# Patient Record
Sex: MALE | Race: BLACK OR AFRICAN AMERICAN | URBAN - METROPOLITAN AREA
[De-identification: names, ages, dates, MRNs, and addresses within clinical notes are randomized per-mention and may not be internally consistent; named-entity substitution may affect disease eponyms.]

---

## 2017-08-14 ENCOUNTER — OFFICE VISIT (OUTPATIENT)
Dept: FAMILY MEDICINE CLINIC | Age: 32
End: 2017-08-14

## 2017-08-14 VITALS
TEMPERATURE: 97.2 F | SYSTOLIC BLOOD PRESSURE: 111 MMHG | HEART RATE: 70 BPM | DIASTOLIC BLOOD PRESSURE: 76 MMHG | RESPIRATION RATE: 14 BRPM | BODY MASS INDEX: 32.47 KG/M2 | OXYGEN SATURATION: 96 % | WEIGHT: 202 LBS | HEIGHT: 66 IN

## 2017-08-14 DIAGNOSIS — R86.8 DECREASED SPERM MOTILITY: ICD-10-CM

## 2017-08-14 DIAGNOSIS — Z23 ENCOUNTER FOR IMMUNIZATION: ICD-10-CM

## 2017-08-14 DIAGNOSIS — Z00.00 ROUTINE MEDICAL EXAM: Primary | ICD-10-CM

## 2017-08-14 DIAGNOSIS — E78.2 MIXED HYPERLIPIDEMIA: ICD-10-CM

## 2017-08-14 LAB
BILIRUB UR QL STRIP: NEGATIVE
GLUCOSE UR-MCNC: NEGATIVE MG/DL
KETONES P FAST UR STRIP-MCNC: NEGATIVE MG/DL
PH UR STRIP: 6 [PH] (ref 4.6–8)
PROT UR QL STRIP: NEGATIVE MG/DL
SP GR UR STRIP: 1.02 (ref 1–1.03)
UA UROBILINOGEN AMB POC: NORMAL (ref 0.2–1)
URINALYSIS CLARITY POC: CLEAR
URINALYSIS COLOR POC: YELLOW
URINE BLOOD POC: NORMAL
URINE LEUKOCYTES POC: NEGATIVE
URINE NITRITES POC: NEGATIVE

## 2017-08-14 NOTE — PROGRESS NOTES
Subjective: Diogo Mills is a 29 y.o. male presenting for his annual checkup. Present for CPE, last Complete Physical exam was couple yrs ago ,  Up todate w/ all vaccination, last tetanus vaccine was in >5 yrs     . Last psa exam was never,     last colonoscopy was never, No past surgical hx,  last bone dexa scan was never, No family hx of breast cancer   no family hx of prostate cancer,    no family hx of colon cancer, father 63 yo from unknown,  mother 52yo  from [place], ++sexaully active and uses Safe sex, +physically active, no cigs, no Etoh  Currently on no meds,     Review of Systems    Constitutional: Negative for chills and fever, not obese okay body mass index for his age. HENT: Negative for ear head pain and nosebleeds. Eyes: Negative for blurred vision, pain and discharge. Respiratory: Negative for shortness of breath, wheezing cough sore throat. Cardiovascular: Negative for chest pain and leg swelling, racing heart . Gastrointestinal: Negative for constipation, diarrhea, nausea and vomiting. Genitourinary: Negative for frequency. Musculoskeletal: Negative for joint pain. Skin: Negative for itching, pimples or acne rash. Neurological: Negative for headaches. Psychiatric/Behavioral: Negative for depression has normal interest to do things and not depressed the patient is not nervous/anxious. Specific concerns today: trying to have kids since last  uable to do so so far, had the sperm count was told to be low on ##S      No Known Allergies  No past medical history on file.   Past Surgical History:   Procedure Laterality Date    HX HEENT       Family History   Problem Relation Age of Onset    Diabetes Mother     Kidney Disease Mother     Hypertension Mother     Diabetes Father     Kidney Disease Father     Stroke Father      Social History   Substance Use Topics    Smoking status: Never Smoker    Smokeless tobacco: Never Used    Alcohol use 1.8 oz/week 3 Cans of beer per week        Lab Results  Component Value Date/Time   WBC 4.9 08/12/2016 12:13 PM   HGB 15.7 08/12/2016 12:13 PM   HCT 45.2 08/12/2016 12:13 PM   PLATELET 702 11/23/9681 12:13 PM   MCV 80 08/12/2016 12:13 PM     Lab Results  Component Value Date/Time   GFR est non- 08/12/2016 12:13 PM   GFR est  08/12/2016 12:13 PM   Creatinine 0.98 08/12/2016 12:13 PM   BUN 11 08/12/2016 12:13 PM   Sodium 142 08/12/2016 12:13 PM   Potassium 4.6 08/12/2016 12:13 PM   Chloride 103 08/12/2016 12:13 PM   CO2 23 08/12/2016 12:13 PM            Objective:   Visit Vitals    /76 (BP 1 Location: Left arm, BP Patient Position: At rest)    Pulse 70    Temp 97.2 °F (36.2 °C) (Oral)    Resp 14    Ht 5' 6\" (1.676 m)    Wt 202 lb (91.6 kg)    SpO2 96%    BMI 32.6 kg/m2     The patient appears well, alert, oriented x 3, in no distress. ENT normal.  Neck supple. No adenopathy or thyromegaly. GEO. Lungs are clear, good air entry, no wheezes, rhonchi or rales. S1 and S2 normal, no murmurs, regular rate and rhythm. Abdomen is soft without tenderness, guarding, mass or organomegaly.  exam: no penile lesions or discharge, no testicular masses or tenderness, no hernias. Extremities show no edema, normal peripheral pulses. Neurological is normal without focal findings. Assessment/Plan:   healthy adult male        Diagnoses and all orders for this visit:    1. Routine medical exam  -     CBC W/O DIFF  -     AMB POC URINALYSIS DIP STICK AUTO W/O MICRO  -     TSH 3RD GENERATION  -     METABOLIC PANEL, COMPREHENSIVE  -     LIPID PANEL    2. Mixed hyperlipidemia  -     METABOLIC PANEL, COMPREHENSIVE  -     LIPID PANEL    3. Encounter for immunization  -     TETANUS, DIPHTHERIA TOXOIDS AND ACELLULAR PERTUSSIS VACCINE (TDAP), IN INDIVIDS. >=7, IM    4. Decreased sperm motility  -     TESTOSTERONE, FREE & TOTAL  -     FSH AND LH  -     PROLACTIN  .     At this time patient was told to lose weight, so that his body mass index would get into a normal level between 20-25,  increase physical activity, limit alcohol consumption, stop secondhand tobacco exposure    In addition the patient was told to start an active life style modifications, for which includes creating a an interesting delightful to do list,  such as start of a light physical activity with a brisk daily walking 30 minutes most days of the week, most likely to total of 150 minutes per week, then the patient was told to try to avoid fatty fast foods, have a low-fat low-cholesterol diet, include seafood such as adding fatty fish such as [place] Pu, Mackerel, [place] to the diet, increase vegetables and fruits, nuts 3-4 times per week and finally have a low-salt and K rich food intake for a good 4-6 months possibly for ever for the best outcome,   All mentioned recommendations, have to be done at least most days of the weeks for the best result,  Routine labs ordered, and the needed abnormal labs will be discussed soon and they can be repeated in 3-6 months. In addition relevant handouts were given to the patient for a better understanding,    patient was told to call if any problems.   Patient acknowledged understanding and     Patient agreed with today's recommendation

## 2017-08-14 NOTE — MR AVS SNAPSHOT
Visit Information Date & Time Provider Department Dept. Phone Encounter #  
 8/14/2017 11:15 AM Darolyn Severance, MD [facility] [phone] [id number] Follow-up Instructions Return in about 6 months (around 2/14/2018), or if symptoms worsen or fail to improve. Upcoming Health Maintenance Date Due DTaP/Tdap/Td series (1 - Tdap) 5/22/2008 INFLUENZA AGE 9 TO ADULT 8/1/2017 Allergies as of 8/14/2017  Review Complete On: 8/14/2017 By: Darolyn Severance, MD  
 No Known Allergies Current Immunizations  Never Reviewed Name Date Tdap  Incomplete Not reviewed this visit You Were Diagnosed With   
  
 Codes Comments Routine medical exam    -  Primary ICD-10-CM: Z00.00 ICD-9-CM: V70.0 Mixed hyperlipidemia     ICD-10-CM: E78.2 ICD-9-CM: 272.2 Encounter for immunization     ICD-10-CM: G37 ICD-9-CM: V03.89 Decreased sperm motility     ICD-10-CM: R86.8 ICD-9-CM: 792.2 Vitals BP Pulse Temp Resp Height(growth percentile) Weight(growth percentile) 111/76 (BP 1 Location: Left arm, BP Patient Position: At rest) 70 97.2 °F (36.2 °C) (Oral) 14 5' 6\" (1.676 m) 202 lb (91.6 kg) SpO2 BMI Smoking Status 96% 32.6 kg/m2 Never Smoker Vitals History BMI and BSA Data Body Mass Index Body Surface Area  
 32.6 kg/m 2 2.07 m 2 Preferred Pharmacy Pharmacy Name Phone [facility] [address] [address] [address] [phone] Your Updated Medication List  
  
Notice  As of 8/14/2017 12:21 PM  
 You have not been prescribed any medications. We Performed the Following AMB POC URINALYSIS DIP STICK AUTO W/O MICRO [92090 CPT(R)] CBC W/O DIFF [88624 CPT(R)] [facility] AND  [46293 CPT(R)] LIPID PANEL [59781 CPT(R)] METABOLIC PANEL, COMPREHENSIVE [34376 CPT(R)] PROLACTIN [13194 CPT(R)] TESTOSTERONE, FREE & TOTAL [57569 CPT(R)] TETANUS, DIPHTHERIA TOXOIDS AND ACELLULAR PERTUSSIS VACCINE (TDAP), IN INDIVIDS. >=7, IM [id number] CPT(R)] TSH 3RD GENERATION [94162 CPT(R)] Follow-up Instructions Return in about 6 months (around 2/14/2018), or if symptoms worsen or fail to improve. Patient Instructions High Cholesterol: Care Instructions Your Care Instructions Cholesterol is a type of fat in your blood. It is needed for many body functions, such as making new cells. Cholesterol is made by your body. It also comes from food you eat. High cholesterol means that you have too much of the fat in your blood. This raises your risk of a heart attack and stroke. LDL and HDL are part of your total cholesterol. LDL is the \"bad\" cholesterol. High LDL can raise your risk for heart disease, heart attack, and stroke. HDL is the \"good\" cholesterol. It helps clear bad cholesterol from the body. High HDL is linked with a lower risk of heart disease, heart attack, and stroke. Your cholesterol levels help your doctor find out your risk for having a heart attack or stroke. You and your doctor can talk about whether you need to lower your risk and what treatment is best for you. A heart-healthy lifestyle along with medicines can help lower your cholesterol and your risk. The way you choose to lower your risk will depend on how high your risk is for heart attack and stroke. It will also depend on how you feel about taking medicines. Follow-up care is a key part of your treatment and safety. Be sure to make and go to all appointments, and call your doctor if you are having problems. It's also a good idea to know your test results and keep a list of the medicines you take. How can you care for yourself at home? · Eat a variety of foods every day.  Good choices include fruits, vegetables, whole grains (like oatmeal), dried beans and peas, nuts and seeds, soy products (like tofu), and fat-free or low-fat dairy products. · Replace butter, margarine, and hydrogenated or partially hydrogenated oils with olive and canola oils. (Canola oil margarine without trans fat is fine.) · Replace red meat with fish, poultry, and soy protein (like tofu). · Limit processed and packaged foods like chips, crackers, and cookies. · Bake, broil, or steam foods. Don't grady them. · Be physically active. Get at least 30 minutes of exercise on most days of the week. Walking is a good choice. You also may want to do other activities, such as running, swimming, cycling, or playing tennis or team sports. · Stay at a healthy weight or lose weight by making the changes in eating and physical activity listed above. Losing just a small amount of weight, even 5 to 10 pounds, can reduce your risk for having a heart attack or stroke. · Do not smoke. When should you call for help? Watch closely for changes in your health, and be sure to contact your doctor if: 
· You need help making lifestyle changes. · You have questions about your medicine. Where can you learn more? Go to http://soniya-leona.info/. Enter C149 in the search box to learn more about \"High Cholesterol: Care Instructions. \" Current as of: April 3, 2017 Content Version: 11.3 © 4696-3912 [employer]. Care instructions adapted under license by [employer] (which disclaims liability or warranty for this information). If you have questions about a medical condition or this instruction, always ask your healthcare professional. [place] [address] any warranty or liability for your use of this information. Statins: Care Instructions Your Care Instructions Statins are medicines that lower your cholesterol and your risk for a heart attack and stroke. Cholesterol is a type of fat in your blood.  If you have too much cholesterol, it can build up in blood vessels. This raises your risk of heart disease, heart attack, and stroke. Statins lower cholesterol by blocking how much cholesterol your body makes. This prevents cholesterol from building up in your blood vessels. This is called hardening of the arteries. It is the starting point for some heart and blood flow problems, such as heart disease. Statins may also reduce inflammation around the buildup (called plaque). This can lower the risk that the plaque will break apart and lead to a heart attack or stroke. A heart-healthy lifestyle is important for lowering your risk whether you take statins or not. This includes eating healthy foods, being active, staying at a healthy weight, and not smoking. You must take statins regularly for them to work well. If you stop, your cholesterol and your risk will go back up. Examples of statins include: · Atorvastatin (Lipitor). · Lovastatin (Mevacor). · Pravastatin (Pravachol). · Simvastatin (Zocor). Statins interact with many medicines. So tell your doctor all of the other medicines that you take. These include prescription medicines, over-the-counter medicines, dietary supplements, and herbal products. Follow-up care is a key part of your treatment and safety. Be sure to make and go to all appointments, and call your doctor if you are having problems. It's also a good idea to know your test results and keep a list of the medicines you take. How can you care for yourself at home? · Take statins exactly as your doctor tells you. High cholesterol has no symptoms. So it is easy to forget to take the pills. Try to make a system that reminds you to take them. · Do not take two or more medicines at the same time unless the doctor told you to. Statins can interact with other medicines. · Always tell your doctor if you think you are having a side effect. If side effects are a problem with one medicine, a different one may be used. · Keep making the lifestyle changes your doctor suggests. Eat heart-healthy foods, be active, don't smoke, and stay at a healthy weight. · Talk to your doctor about avoiding grapefruit juice if you take statins. Grapefruit juice can raise the level of this medicine in your blood. This could increase side effects. When should you call for help? Watch closely for changes in your health, and be sure to contact your doctor if: 
· You have side effects of statins. These include: ¨ Fatigue. ¨ Upset stomach. ¨ Gas. ¨ Constipation. ¨ Pain or cramps in the belly. ¨ Muscle aches. · You have any new symptoms or side effects. Where can you learn more? Go to http://soniya-leona.info/. Enter R358 in the search box to learn more about \"Statins: Care Instructions. \" Current as of: April 3, 2017 Content Version: 11.3 © 5275-6988 [employer]. Care instructions adapted under license by [employer] (which disclaims liability or warranty for this information). If you have questions about a medical condition or this instruction, always ask your healthcare professional. [place] [address] any warranty or liability for your use of this information. Heart-Healthy Diet: Care Instructions Your Care Instructions A heart-healthy diet has lots of vegetables, fruits, nuts, beans, and whole grains, and is low in salt. It limits foods that are high in saturated fat, such as meats, cheeses, and fried foods. It may be hard to change your diet, but even small changes can lower your risk of heart attack and heart disease. Follow-up care is a key part of your treatment and safety. Be sure to make and go to all appointments, and call your doctor if you are having problems. It's also a good idea to know your test results and keep a list of the medicines you take. How can you care for yourself at home? Watch your portions · Learn what a serving is. A \"serving\" and a \"portion\" are not always the same thing. Make sure that you are not eating larger portions than are recommended. For example, a serving of pasta is ½ cup. A serving size of meat is 2 to 3 ounces. A 3-ounce serving is about the size of a deck of cards. Measure serving sizes until you are good at [place]" them. Keep in mind that restaurants often serve portions that are 2 or 3 times the size of one serving. · To keep your energy level up and keep you from feeling hungry, eat often but in smaller portions. · Eat only the number of calories you need to stay at a healthy weight. If you need to lose weight, eat fewer calories than your body burns (through exercise and other physical activity). Eat more fruits and vegetables · Eat a variety of fruit and vegetables every day. Dark green, deep orange, red, or yellow fruits and vegetables are especially good for you. Examples include spinach, carrots, peaches, and berries. · Keep carrots, celery, and other veggies handy for snacks. Buy fruit that is in season and store it where you can see it so that you will be tempted to eat it. · Cook dishes that have a lot of veggies in them, such as stir-fries and soups. Limit saturated and trans fat · Read food labels, and try to avoid saturated and trans fats. They increase your risk of heart disease. Trans fat is found in many processed foods such as cookies and crackers. · Use olive or canola oil when you cook. Try cholesterol-lowering spreads, such as Benecol or Take Control. · Bake, broil, grill, or steam foods instead of frying them. · Choose lean meats instead of high-fat meats such as hot dogs and sausages. Cut off all visible fat when you prepare meat. · Eat fish, skinless poultry, and meat alternatives such as soy products instead of high-fat meats. Soy products, such as tofu, may be especially good for your heart. · Choose low-fat or fat-free milk and dairy products. Eat fish · Eat at least two servings of fish a week. Certain fish, such as salmon and tuna, contain omega-3 fatty acids, which may help reduce your risk of heart attack. Eat foods high in fiber · Eat a variety of grain products every day. Include whole-grain foods that have lots of fiber and nutrients. Examples of whole-grain foods include oats, whole wheat bread, and brown rice. · Buy whole-grain breads and cereals, instead of white bread or pastries. Limit salt and sodium · Limit how much salt and sodium you eat to help lower your blood pressure. · Taste food before you salt it. Add only a little salt when you think you need it. With time, your taste buds will adjust to less salt. · Eat fewer snack items, fast foods, and other high-salt, processed foods. Check food labels for the amount of sodium in packaged foods. · Choose low-sodium versions of canned goods (such as soups, vegetables, and beans). Limit sugar · Limit drinks and foods with added sugar. These include candy, desserts, and soda pop. Limit alcohol · Limit alcohol to no more than 2 drinks a day for men and 1 drink a day for women. Too much alcohol can cause health problems. When should you call for help? Watch closely for changes in your health, and be sure to contact your doctor if: 
· You would like help planning heart-healthy meals. Where can you learn more? Go to http://soniya-leona.info/. Enter V137 in the search box to learn more about \"Heart-Healthy Diet: Care Instructions. \" Current as of: April 3, 2017 Content Version: 11.3 © 0732-4607 [employer] Care instructions adapted under license by [employer] (which disclaims liability or warranty for this information).  If you have questions about a medical condition or this instruction, always ask your healthcare professional. [employer] disclaims any warranty or liability for your use of this information. Introducing hospitals & HEALTH SERVICES! Humberto Bates introduces [employer] patient portal. Now you can access parts of your medical record, email your doctor's office, and request medication refills online. 1. In your internet browser, go to [email or web address] 2. Click on the First Time User? Click Here link in the Sign In box. You will see the New Member Sign Up page. 3. Enter your [employer] Access Code exactly as it appears below. You will not need to use this code after youve completed the sign-up process. If you do not sign up before the expiration date, you must request a new code. · [employer] Access Code: [email or web address] Expires: 11/12/2017 12:21 PM 
 
4. Enter the last four digits of your Social Security Number (xxxx) and Date of Birth (mm/dd/yyyy) as indicated and click Submit. You will be taken to the next sign-up page. 5. Create a [employer] ID. This will be your [employer] login ID and cannot be changed, so think of one that is secure and easy to remember. 6. Create a [employer] password. You can change your password at any time. 7. Enter your Password Reset Question and Answer. This can be used at a later time if you forget your password. 8. Enter your e-mail address. You will receive e-mail notification when new information is available in [address] 9. Click Sign Up. You can now view and download portions of your medical record. 10. Click the Download Summary menu link to download a portable copy of your medical information. If you have questions, please visit the Frequently Asked Questions section of the [employer] website. Remember, [employer] is NOT to be used for urgent needs. For medical emergencies, dial 911. Now available from your iPhone and Android! Please provide this summary of care documentation to your next provider. Your primary care clinician is listed as Darolyn Severance. If you have any questions after today's visit, please call [phone].

## 2017-08-14 NOTE — PROGRESS NOTES
Zeny Onofre        Name and  verified        Chief Complaint   Patient presents with   [address] [facility] Patient (Former patient of Dr. Gabby Zapata)

## 2017-08-14 NOTE — PATIENT INSTRUCTIONS
High Cholesterol: Care Instructions  Your Care Instructions  Cholesterol is a type of fat in your blood. It is needed for many body functions, such as making new cells. Cholesterol is made by your body. It also comes from food you eat. High cholesterol means that you have too much of the fat in your blood. This raises your risk of a heart attack and stroke. LDL and HDL are part of your total cholesterol. LDL is the \"bad\" cholesterol. High LDL can raise your risk for heart disease, heart attack, and stroke. HDL is the \"good\" cholesterol. It helps clear bad cholesterol from the body. High HDL is linked with a lower risk of heart disease, heart attack, and stroke. Your cholesterol levels help your doctor find out your risk for having a heart attack or stroke. You and your doctor can talk about whether you need to lower your risk and what treatment is best for you. A heart-healthy lifestyle along with medicines can help lower your cholesterol and your risk. The way you choose to lower your risk will depend on how high your risk is for heart attack and stroke. It will also depend on how you feel about taking medicines. Follow-up care is a key part of your treatment and safety. Be sure to make and go to all appointments, and call your doctor if you are having problems. It's also a good idea to know your test results and keep a list of the medicines you take. How can you care for yourself at home? · Eat a variety of foods every day. Good choices include fruits, vegetables, whole grains (like oatmeal), dried beans and peas, nuts and seeds, soy products (like tofu), and fat-free or low-fat dairy products. · Replace butter, margarine, and hydrogenated or partially hydrogenated oils with olive and canola oils. (Canola oil margarine without trans fat is fine.)  · Replace red meat with fish, poultry, and soy protein (like tofu). · Limit processed and packaged foods like chips, crackers, and cookies.   · Bake, broil, or steam foods. Don't grady them. · Be physically active. Get at least 30 minutes of exercise on most days of the week. Walking is a good choice. You also may want to do other activities, such as running, swimming, cycling, or playing tennis or team sports. · Stay at a healthy weight or lose weight by making the changes in eating and physical activity listed above. Losing just a small amount of weight, even 5 to 10 pounds, can reduce your risk for having a heart attack or stroke. · Do not smoke. When should you call for help? Watch closely for changes in your health, and be sure to contact your doctor if:  · You need help making lifestyle changes. · You have questions about your medicine. Where can you learn more? Go to http://soniya-leona.info/. Enter A075 in the search box to learn more about \"High Cholesterol: Care Instructions. \"  Current as of: April 3, 2017  Content Version: 11.3  © 5610-4785 Tarisa. Care instructions adapted under license by [employer] (which disclaims liability or warranty for this information). If you have questions about a medical condition or this instruction, always ask your healthcare professional. [address] any warranty or liability for your use of this information. Statins: Care Instructions  Your Care Instructions  Statins are medicines that lower your cholesterol and your risk for a heart attack and stroke. Cholesterol is a type of fat in your blood. If you have too much cholesterol, it can build up in blood vessels. This raises your risk of heart disease, heart attack, and stroke. Statins lower cholesterol by blocking how much cholesterol your body makes. This prevents cholesterol from building up in your blood vessels. This is called hardening of the arteries. It is the starting point for some heart and blood flow problems, such as heart disease.  Statins may also reduce inflammation around the buildup (called plaque). This can lower the risk that the plaque will break apart and lead to a heart attack or stroke. A heart-healthy lifestyle is important for lowering your risk whether you take statins or not. This includes eating healthy foods, being active, staying at a healthy weight, and not smoking. You must take statins regularly for them to work well. If you stop, your cholesterol and your risk will go back up. Examples of statins include:  · Atorvastatin (Lipitor). · Lovastatin (Mevacor). · Pravastatin (Pravachol). · Simvastatin (Zocor). Statins interact with many medicines. So tell your doctor all of the other medicines that you take. These include prescription medicines, over-the-counter medicines, dietary supplements, and herbal products. Follow-up care is a key part of your treatment and safety. Be sure to make and go to all appointments, and call your doctor if you are having problems. It's also a good idea to know your test results and keep a list of the medicines you take. How can you care for yourself at home? · Take statins exactly as your doctor tells you. High cholesterol has no symptoms. So it is easy to forget to take the pills. Try to make a system that reminds you to take them. · Do not take two or more medicines at the same time unless the doctor told you to. Statins can interact with other medicines. · Always tell your doctor if you think you are having a side effect. If side effects are a problem with one medicine, a different one may be used. · Keep making the lifestyle changes your doctor suggests. Eat heart-healthy foods, be active, don't smoke, and stay at a healthy weight. · Talk to your doctor about avoiding grapefruit juice if you take statins. Grapefruit juice can raise the level of this medicine in your blood. This could increase side effects. When should you call for help?   Watch closely for changes in your health, and be sure to contact your doctor if:  · You have side effects of statins. These include:  ¨ Fatigue. ¨ Upset stomach. ¨ Gas. ¨ Constipation. ¨ Pain or cramps in the belly. ¨ Muscle aches. · You have any new symptoms or side effects. Where can you learn more? Go to http://soniya-leona.info/. Enter R358 in the search box to learn more about \"Statins: Care Instructions. \"  Current as of: April 3, 2017  Content Version: 11.3  © 7993-2275 Kwicr. Care instructions adapted under license by [employer] (which disclaims liability or warranty for this information). If you have questions about a medical condition or this instruction, always ask your healthcare professional. [address] any warranty or liability for your use of this information. Heart-Healthy Diet: Care Instructions  Your Care Instructions    A heart-healthy diet has lots of vegetables, fruits, nuts, beans, and whole grains, and is low in salt. It limits foods that are high in saturated fat, such as meats, cheeses, and fried foods. It may be hard to change your diet, but even small changes can lower your risk of heart attack and heart disease. Follow-up care is a key part of your treatment and safety. Be sure to make and go to all appointments, and call your doctor if you are having problems. It's also a good idea to know your test results and keep a list of the medicines you take. How can you care for yourself at home? Watch your portions  · Learn what a serving is. A \"serving\" and a \"portion\" are not always the same thing. Make sure that you are not eating larger portions than are recommended. For example, a serving of pasta is ½ cup. A serving size of meat is 2 to 3 ounces. A 3-ounce serving is about the size of a deck of cards. Measure serving sizes until you are good at [place]" them. Keep in mind that restaurants often serve portions that are 2 or 3 times the size of one serving.   · To keep your energy level up and keep you from feeling hungry, eat often but in smaller portions. · Eat only the number of calories you need to stay at a healthy weight. If you need to lose weight, eat fewer calories than your body burns (through exercise and other physical activity). Eat more fruits and vegetables  · Eat a variety of fruit and vegetables every day. Dark green, deep orange, red, or yellow fruits and vegetables are especially good for you. Examples include spinach, carrots, peaches, and berries. · Keep carrots, celery, and other veggies handy for snacks. Buy fruit that is in season and store it where you can see it so that you will be tempted to eat it. · Cook dishes that have a lot of veggies in them, such as stir-fries and soups. Limit saturated and trans fat  · Read food labels, and try to avoid saturated and trans fats. They increase your risk of heart disease. Trans fat is found in many processed foods such as cookies and crackers. · Use olive or canola oil when you cook. Try cholesterol-lowering spreads, such as Benecol or Take Control. · Bake, broil, grill, or steam foods instead of frying them. · Choose lean meats instead of high-fat meats such as hot dogs and sausages. Cut off all visible fat when you prepare meat. · Eat fish, skinless poultry, and meat alternatives such as soy products instead of high-fat meats. Soy products, such as tofu, may be especially good for your heart. · Choose low-fat or fat-free milk and dairy products. Eat fish  · Eat at least two servings of fish a week. Certain fish, such as salmon and tuna, contain omega-3 fatty acids, which may help reduce your risk of heart attack. Eat foods high in fiber  · Eat a variety of grain products every day. Include whole-grain foods that have lots of fiber and nutrients. Examples of whole-grain foods include oats, whole wheat bread, and brown rice.   · Buy whole-grain breads and cereals, instead of white bread or pastries. Limit salt and sodium  · Limit how much salt and sodium you eat to help lower your blood pressure. · Taste food before you salt it. Add only a little salt when you think you need it. With time, your taste buds will adjust to less salt. · Eat fewer snack items, fast foods, and other high-salt, processed foods. Check food labels for the amount of sodium in packaged foods. · Choose low-sodium versions of canned goods (such as soups, vegetables, and beans). Limit sugar  · Limit drinks and foods with added sugar. These include candy, desserts, and soda pop. Limit alcohol  · Limit alcohol to no more than 2 drinks a day for men and 1 drink a day for women. Too much alcohol can cause health problems. When should you call for help? Watch closely for changes in your health, and be sure to contact your doctor if:  · You would like help planning heart-healthy meals. Where can you learn more? Go to http://soniya-leona.info/. Enter V137 in the search box to learn more about \"Heart-Healthy Diet: Care Instructions. \"  Current as of: April 3, 2017  Content Version: 11.3  © 5565-3703 [employer]. Care instructions adapted under license by [employer] (which disclaims liability or warranty for this information). If you have questions about a medical condition or this instruction, always ask your healthcare professional. [place] [address] any warranty or liability for your use of this information.

## 2017-08-15 LAB
ALBUMIN SERPL-MCNC: 4.6 G/DL (ref 3.5–5.5)
ALBUMIN/GLOB SERPL: 1.7 {RATIO} (ref 1.2–2.2)
ALP SERPL-CCNC: 73 IU/L (ref 39–117)
ALT SERPL-CCNC: 63 IU/L (ref 0–44)
AST SERPL-CCNC: 43 IU/L (ref 0–40)
BILIRUB SERPL-MCNC: 0.5 MG/DL (ref 0–1.2)
BUN SERPL-MCNC: 12 MG/DL (ref 6–20)
BUN/CREAT SERPL: 13 (ref 9–20)
CALCIUM SERPL-MCNC: 9.6 MG/DL (ref 8.7–10.2)
CHLORIDE SERPL-SCNC: 106 MMOL/L (ref 96–106)
CHOLEST SERPL-MCNC: 182 MG/DL (ref 100–199)
CO2 SERPL-SCNC: 23 MMOL/L (ref 18–29)
CREAT SERPL-MCNC: 0.96 MG/DL (ref 0.76–1.27)
ERYTHROCYTE [DISTWIDTH] IN BLOOD BY AUTOMATED COUNT: 13.3 % (ref 12.3–15.4)
FSH SERPL-ACNC: 15.7 MIU/ML (ref 1.5–12.4)
GLOBULIN SER CALC-MCNC: 2.7 G/DL (ref 1.5–4.5)
GLUCOSE SERPL-MCNC: 86 MG/DL (ref 65–99)
HCT VFR BLD AUTO: 45 % (ref 37.5–51)
HDLC SERPL-MCNC: 47 MG/DL
HGB BLD-MCNC: 15.7 G/DL (ref 12.6–17.7)
LDLC SERPL CALC-MCNC: 123 MG/DL (ref 0–99)
LH SERPL-ACNC: 7.3 MIU/ML (ref 1.7–8.6)
MCH RBC QN AUTO: 28.1 PG (ref 26.6–33)
MCHC RBC AUTO-ENTMCNC: 34.9 G/DL (ref 31.5–35.7)
MCV RBC AUTO: 81 FL (ref 79–97)
PLATELET # BLD AUTO: 233 X10E3/UL (ref 150–379)
POTASSIUM SERPL-SCNC: 4.6 MMOL/L (ref 3.5–5.2)
PROLACTIN SERPL-MCNC: 7.8 NG/ML (ref 4–15.2)
PROT SERPL-MCNC: 7.3 G/DL (ref 6–8.5)
RBC # BLD AUTO: 5.58 X10E6/UL (ref 4.14–5.8)
SODIUM SERPL-SCNC: 145 MMOL/L (ref 134–144)
TESTOST FREE SERPL-MCNC: 11.6 PG/ML (ref 8.7–25.1)
TESTOST SERPL-MCNC: 392 NG/DL (ref 264–916)
TRIGL SERPL-MCNC: 60 MG/DL (ref 0–149)
TSH SERPL DL<=0.005 MIU/L-ACNC: 0.6 UIU/ML (ref 0.45–4.5)
VLDLC SERPL CALC-MCNC: 12 MG/DL (ref 5–40)
WBC # BLD AUTO: 4.7 X10E3/UL (ref 3.4–10.8)

## 2017-09-01 ENCOUNTER — TELEPHONE (OUTPATIENT)
Dept: FAMILY MEDICINE CLINIC | Age: 32
End: 2017-09-01

## 2017-09-01 NOTE — TELEPHONE ENCOUNTER
----- Message from Jose Guadalupe Lucia sent at 9/1/2017  9:01 AM EDT -----  Regarding: Dr. Viviane Moncada Telephone  Patient would like a call back regarding his test results.  Contact is [phone]

## 2017-10-19 ENCOUNTER — OFFICE VISIT (OUTPATIENT)
Dept: FAMILY MEDICINE CLINIC | Age: 32
End: 2017-10-19

## 2017-10-19 VITALS
HEIGHT: 66 IN | BODY MASS INDEX: 31.43 KG/M2 | DIASTOLIC BLOOD PRESSURE: 79 MMHG | HEART RATE: 73 BPM | WEIGHT: 195.6 LBS | OXYGEN SATURATION: 98 % | TEMPERATURE: 97 F | RESPIRATION RATE: 14 BRPM | SYSTOLIC BLOOD PRESSURE: 108 MMHG

## 2017-10-19 DIAGNOSIS — R86.8 DECREASED SPERM MOTILITY: ICD-10-CM

## 2017-10-19 DIAGNOSIS — R79.89 ABNORMAL LIVER FUNCTION TEST: Primary | ICD-10-CM

## 2017-10-19 DIAGNOSIS — H91.93 HEARING PROBLEM OF BOTH EARS: ICD-10-CM

## 2017-10-19 DIAGNOSIS — H50.10 EXOTROPIA OF BOTH EYES: ICD-10-CM

## 2017-10-19 NOTE — PROGRESS NOTES
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, was also havign trouble with hearing since birth,         No Known Allergies  Past Medical History:   Diagnosis Date    Abnormal liver function test 10/19/2017    Decreased sperm motility 8/14/2017     Past Surgical History:   Procedure Laterality Date    HX HEENT       Family History   Problem Relation Age of Onset    Diabetes Mother     Kidney Disease Mother     Hypertension Mother     Diabetes Father     Kidney Disease Father     Stroke Father      Social History   Substance Use Topics    Smoking status: Never Smoker    Smokeless tobacco: Never Used    Alcohol use 1.8 oz/week     3 Cans of beer per week      Lab Results  Component Value Date/Time   Cholesterol, total 182 08/14/2017 12:22 PM   HDL Cholesterol 47 08/14/2017 12:22 PM   LDL, calculated 123 08/14/2017 12:22 PM   Triglyceride 60 08/14/2017 12:22 PM     Lab Results  Component Value Date/Time   ALT (SGPT) 63 08/14/2017 12:22 PM   AST (SGOT) 43 08/14/2017 12:22 PM   Alk.  phosphatase 73 08/14/2017 12:22 PM   Bilirubin, total 0.5 08/14/2017 12:22 PM   Albumin 4.6 08/14/2017 12:22 PM   Protein, total 7.3 08/14/2017 12:22 PM   PLATELET 080 97/93/9735 12:22 PM       Lab Results  Component Value Date/Time   GFR est non- 08/14/2017 12:22 PM   GFR est  08/14/2017 12:22 PM   Creatinine 0.96 08/14/2017 12:22 PM   BUN 12 08/14/2017 12:22 PM   Sodium 145 08/14/2017 12:22 PM   Potassium 4.6 08/14/2017 12:22 PM   Chloride 106 08/14/2017 12:22 PM   CO2 23 08/14/2017 12:22 PM   No results found for: Momo SANDERS, [id number], [id number], PSALTLab Results  Component Value Date/Time   TSH 0.599 08/14/2017 12:22 PM           Review of Systems   Constitutional: Negative for chills and fever. HENT: Negative for ear pain and nosebleeds. Eyes: Negative for blurred vision, pain and discharge. Respiratory: Negative for shortness of breath. Cardiovascular: Negative for chest pain and leg swelling. Gastrointestinal: Negative for constipation, diarrhea, nausea and vomiting. Genitourinary: Negative for frequency. Musculoskeletal: Negative for joint pain. Skin: Negative for itching and rash. Neurological: Negative for headaches. Psychiatric/Behavioral: Negative for depression. The patient is not nervous/anxious. Physical Exam   Constitutional: He is oriented to person, place, and time. He appears well-developed and well-nourished. HENT:   Head: Normocephalic and atraumatic. Mouth/Throat: No oropharyngeal exudate. Eyes: Conjunctivae and EOM are normal.   Neck: Normal range of motion. Neck supple. Cardiovascular: Normal rate, regular rhythm and normal heart sounds. No murmur heard. Pulmonary/Chest: Effort normal and breath sounds normal. No respiratory distress. Abdominal: Soft. Bowel sounds are normal. He exhibits no distension. There is no rebound. Musculoskeletal: He exhibits no edema or tenderness. Neurological: He is alert and oriented to person, place, and time. Skin: Skin is warm. No erythema. Psychiatric: He has a normal mood and affect. His behavior is normal.   Nursing note and vitals reviewed. ASSESSMENT and PLAN  Diagnoses and all orders for this visit:    1. Abnormal liver function test  -     CBC W/O DIFF  -     METABOLIC PANEL, COMPREHENSIVE  -     TSH 3RD GENERATION  -     LIPID PANEL  -     FSH AND LH  -     DHEA SULFATE  -     HIV 1/2 AG/AB, 4TH GENERATION,W RFLX CONFIRM  -     HEP B SURFACE AG  -     HEPATITIS C AB  -     PROSTATE SPECIFIC AG  -     TESTOSTERONE, FREE & TOTAL  -     ESTRADIOL  -     RPR  -     PROLACTIN    2.  Decreased sperm motility  -     CBC W/O DIFF  -     METABOLIC PANEL, COMPREHENSIVE  - TSH 3RD GENERATION  -     LIPID PANEL  -     FSH AND LH  -     DHEA SULFATE  -     HIV 1/2 AG/AB, 4TH GENERATION,W RFLX CONFIRM  -     HEP B SURFACE AG  -     HEPATITIS C AB  -     PROSTATE SPECIFIC AG  -     TESTOSTERONE, FREE & TOTAL  -     ESTRADIOL  -     RPR  -     PROLACTIN    3. Hearing problem of both ears  -     CBC W/O DIFF  -     METABOLIC PANEL, COMPREHENSIVE  -     TSH 3RD GENERATION  -     LIPID PANEL  -     FSH AND LH  -     DHEA SULFATE  -     HIV 1/2 AG/AB, 4TH GENERATION,W RFLX CONFIRM  -     HEP B SURFACE AG  -     HEPATITIS C AB  -     PROSTATE SPECIFIC AG  -     TESTOSTERONE, FREE & TOTAL  -     ESTRADIOL  -     RPR  -     PROLACTIN    4.  Exotropia of both eyes    f/u with ophtha not acute

## 2017-10-19 NOTE — MR AVS SNAPSHOT
Visit Information Date & Time Provider Department Dept. Phone Encounter #  
 10/19/2017  7:30 AM Chau Spencer MD [address] [place] [address] OFFICE-[facility] [phone] [id number] Upcoming Health Maintenance Date Due DTaP/Tdap/Td series (1 - Tdap) 5/22/2008 Allergies as of 10/19/2017  Review Complete On: 10/19/2017 By: Jas Alexandre LPN No Known Allergies Current Immunizations  Never Reviewed Name Date Tdap  Deferred (Patient Refused), 8/19/2012 Not reviewed this visit You Were Diagnosed With   
  
 Codes Comments Abnormal liver function test    -  Primary ICD-10-CM: R79.89 ICD-9-CM: 790.6 Decreased sperm motility     ICD-10-CM: R86.8 ICD-9-CM: 792.2 Hearing problem of both ears     ICD-10-CM: H91.93 
ICD-9-CM: V41.2 Exotropia of both eyes     ICD-10-CM: H50.10 ICD-9-CM: 378.10 Vitals BP Pulse Temp Resp Height(growth percentile) Weight(growth percentile) 108/79 (BP 1 Location: Left arm, BP Patient Position: At rest) 73 97 °F (36.1 °C) (Oral) 14 5' 6\" (1.676 m) 195 lb 9.6 oz (88.7 kg) SpO2 BMI Smoking Status 98% 31.57 kg/m2 Never Smoker Vitals History BMI and BSA Data Body Mass Index Body Surface Area  
 31.57 kg/m 2 2.03 m 2 Preferred Pharmacy Pharmacy Name Phone [facility] [address] [place] [address] [phone] Your Updated Medication List  
  
Notice  As of 10/19/2017  8:36 AM  
 You have not been prescribed any medications. We Performed the Following CBC W/O DIFF [37356 CPT(R)] DHEA SULFATE [96509 CPT(R)] ESTRADIOL [id number] CPT(R)] [facility] AND LH [78477 CPT(R)] HEP B SURFACE AG [id number] CPT(R)] HEPATITIS C AB [99011 CPT(R)] HIV 1/2 AG/AB, 4TH GENERATION,W RFLX CONFIRM [[id number] Custom] LIPID PANEL [02099 CPT(R)] METABOLIC PANEL, COMPREHENSIVE [93774 CPT(R)] PROLACTIN [39056 CPT(R)] PSA, DIAGNOSTIC (PROSTATE SPECIFIC AG) [id number] CPT(R)] RPR [36591 CPT(R)] TESTOSTERONE, FREE & TOTAL [27327 CPT(R)] TSH 3RD GENERATION [46180 CPT(R)] Introducing \[facility]\"" & HEALTH SERVICES! Dear Bryn Cobb: Thank you for requesting a [facility] account. Our records indicate that you already have an active [facility] account. You can access your account anytime at [email or web address] Did you know that you can access your hospital and ER discharge instructions at any time in [employer]? You can also review all of your test results from your hospital stay or ER visit. Additional Information If you have questions, please visit the Frequently Asked Questions section of the [employer] website at [email or web address] Remember, [facility] is NOT to be used for urgent needs. For medical emergencies, dial 911. Now available from your iPhone and Android! Please provide this summary of care documentation to your next provider. Your primary care clinician is listed as Genevieve Saba. If you have any questions after today's visit, please call [phone].

## 2017-10-19 NOTE — PROGRESS NOTES
Alka Brody      Name and  verified        Chief Complaint   Patient presents with    Abnormal Lab Results         Health Maintenance reviewed-discussed with patient. 1. Have you been to the ER, urgent care clinic since your last visit? Hospitalized since your last visit? No    2. Have you seen or consulted any other health care providers outside of the [address] [place] [address] since your last visit? Include any pap smears or colon screening.  No

## 2017-10-21 LAB
ALBUMIN SERPL-MCNC: 4.4 G/DL (ref 3.5–5.5)
ALBUMIN/GLOB SERPL: 1.7 {RATIO} (ref 1.2–2.2)
ALP SERPL-CCNC: 70 IU/L (ref 39–117)
ALT SERPL-CCNC: 29 IU/L (ref 0–44)
AST SERPL-CCNC: 20 IU/L (ref 0–40)
BILIRUB SERPL-MCNC: 0.3 MG/DL (ref 0–1.2)
BUN SERPL-MCNC: 12 MG/DL (ref 6–20)
BUN/CREAT SERPL: 12 (ref 9–20)
CALCIUM SERPL-MCNC: 9.5 MG/DL (ref 8.7–10.2)
CHLORIDE SERPL-SCNC: 106 MMOL/L (ref 96–106)
CHOLEST SERPL-MCNC: 141 MG/DL (ref 100–199)
CO2 SERPL-SCNC: 25 MMOL/L (ref 18–29)
CREAT SERPL-MCNC: 1.01 MG/DL (ref 0.76–1.27)
DHEA-S SERPL-MCNC: 281.1 UG/DL (ref 138.5–475.2)
ERYTHROCYTE [DISTWIDTH] IN BLOOD BY AUTOMATED COUNT: 13.1 % (ref 12.3–15.4)
ESTRADIOL SERPL-MCNC: 9.8 PG/ML (ref 7.6–42.6)
FSH SERPL-ACNC: 14.7 MIU/ML (ref 1.5–12.4)
GFR SERPLBLD CREATININE-BSD FMLA CKD-EPI: 115 ML/MIN/1.73
GFR SERPLBLD CREATININE-BSD FMLA CKD-EPI: 99 ML/MIN/1.73
GLOBULIN SER CALC-MCNC: 2.6 G/DL (ref 1.5–4.5)
GLUCOSE SERPL-MCNC: 90 MG/DL (ref 65–99)
HBV SURFACE AG SERPL QL IA: NEGATIVE
HCT VFR BLD AUTO: 42.9 % (ref 37.5–51)
HCV AB S/CO SERPL IA: <0.1 S/CO RATIO (ref 0–0.9)
HDLC SERPL-MCNC: 40 MG/DL
HGB BLD-MCNC: 15.1 G/DL (ref 12.6–17.7)
HIV 1+2 AB+HIV1 P24 AG SERPL QL IA: NON REACTIVE
LDLC SERPL CALC-MCNC: 90 MG/DL (ref 0–99)
LH SERPL-ACNC: 9.1 MIU/ML (ref 1.7–8.6)
MCH RBC QN AUTO: 28.2 PG (ref 26.6–33)
MCHC RBC AUTO-ENTMCNC: 35.2 G/DL (ref 31.5–35.7)
MCV RBC AUTO: 80 FL (ref 79–97)
PLATELET # BLD AUTO: 216 X10E3/UL (ref 150–379)
POTASSIUM SERPL-SCNC: 4.7 MMOL/L (ref 3.5–5.2)
PROLACTIN SERPL-MCNC: 6.8 NG/ML (ref 4–15.2)
PROT SERPL-MCNC: 7 G/DL (ref 6–8.5)
PSA SERPL-MCNC: 0.7 NG/ML (ref 0–4)
RBC # BLD AUTO: 5.36 X10E6/UL (ref 4.14–5.8)
RPR SER QL: NON REACTIVE
SODIUM SERPL-SCNC: 145 MMOL/L (ref 134–144)
TESTOST FREE SERPL-MCNC: 10.1 PG/ML (ref 8.7–25.1)
TESTOST SERPL-MCNC: 390 NG/DL (ref 264–916)
TRIGL SERPL-MCNC: 55 MG/DL (ref 0–149)
TSH SERPL DL<=0.005 MIU/L-ACNC: 0.72 UIU/ML (ref 0.45–4.5)
VLDLC SERPL CALC-MCNC: 11 MG/DL (ref 5–40)
WBC # BLD AUTO: 3.9 X10E3/UL (ref 3.4–10.8)

## 2017-12-14 DIAGNOSIS — R86.8 DECREASED SPERM MOTILITY: Primary | ICD-10-CM

## 2018-03-09 ENCOUNTER — OFFICE VISIT (OUTPATIENT)
Dept: ENDOCRINOLOGY | Age: 33
End: 2018-03-09

## 2018-03-09 VITALS
BODY MASS INDEX: 30.87 KG/M2 | HEIGHT: 66 IN | HEART RATE: 74 BPM | DIASTOLIC BLOOD PRESSURE: 83 MMHG | WEIGHT: 192.1 LBS | SYSTOLIC BLOOD PRESSURE: 119 MMHG

## 2018-03-09 DIAGNOSIS — R79.89 ABNORMAL PITUITARY LUTEINIZING HORMONE (LH): Primary | ICD-10-CM

## 2018-03-09 RX ORDER — BISMUTH SUBSALICYLATE 262 MG
1 TABLET,CHEWABLE ORAL DAILY
COMMUNITY

## 2018-03-09 NOTE — MR AVS SNAPSHOT
[address] [place] [address] [address] [address] [phone] Patient: Kamari Lee MRN: [id number] QDF:[id number] Visit Information Date & Time Provider Department Dept. Phone Encounter #  
 3/9/2018  2:50 PM Dee Dee Negron, [address] [place] [address] Diabetes and Endocrinology [phone] Follow-up Instructions Return in about 2 months (around 5/9/2018). Upcoming Health Maintenance Date Due DTaP/Tdap/Td series (2 - Td) 8/19/2022 Allergies as of 3/9/2018  Review Complete On: 3/9/2018 By: Dee Dee Negron MD  
 No Known Allergies Current Immunizations  Never Reviewed Name Date Tdap  Deferred (Patient Refused), 8/19/2012 Not reviewed this visit You Were Diagnosed With   
  
 Codes Comments Abnormal pituitary luteinizing hormone (LH)    -  Primary ICD-10-CM: R79.9 ICD-9-CM: 790.99 Vitals BP Pulse Height(growth percentile) Weight(growth percentile) BMI Smoking Status 119/83 (BP 1 Location: Left arm, BP Patient Position: Sitting) 74 5' 6\" (1.676 m) 192 lb 1.6 oz (87.1 kg) 31.01 kg/m2 Never Smoker Vitals History BMI and BSA Data Body Mass Index Body Surface Area 31.01 kg/m 2 2.01 m 2 Preferred Pharmacy Pharmacy Name Phone [facility] [address] [address] [address] [facility] [phone] Your Updated Medication List  
  
   
This list is accurate as of 3/9/18  3:52 PM.  Always use your most recent med list.  
  
  
  
  
 BIOTIN PO Take 300 mg by mouth. FRANNY EXTRACT PO Take  by mouth.  
  
 multivitamin tablet Commonly known as:  ONE A DAY Take 1 Tab by mouth daily. We Performed the Following CHROMOSOME ANALYSIS, BLOOD (CONSTITUTIONAL) [[id number] Custom] Comments:  
 KARYOTYPE ANALYSIS  
 [address] AND  [id number] CPT(R)] PROLACTIN [20296 CPT(R)] TESTOSTERONE, FREE & TOTAL [51957 CPT(R)] Follow-up Instructions Return in about 2 months (around 5/9/2018). Introducing [facility] & HEALTH SERVICES! Dear Ammon Jarvis: Thank you for requesting a [employer] account. Our records indicate that you already have an active [employer] account. You can access your account anytime at [email or web address] Did you know that you can access your hospital and ER discharge instructions at any time in [employer]? You can also review all of your test results from your hospital stay or ER visit. Additional Information If you have questions, please visit the Frequently Asked Questions section of the [employer] website at [email or web address] Remember, [employer] is NOT to be used for urgent needs. For medical emergencies, dial 911. Now available from your iPhone and Android! Please provide this summary of care documentation to your next provider. Your primary care clinician is listed as Micheline Quintanilla. If you have any questions after today's visit, please call [phone].

## 2018-03-09 NOTE — PROGRESS NOTES
CONSULTATION REQUESTED BY: Pattie Calderon MD     REASON FOR CONSULT: elevated gonadotropins    CHIEF COMPLAINT: abnormal blood test    HISTORY OF PRESENT ILLNESS:   Kvng Coulter is a 29 y.o. male with a PMHx as noted below who was referred to our endocrinology clinic for evaluation of elevated gonadotropins. Patient notes that he discussed concerns regarding infertility. He has been trying with his partner for a couple of years unsuccessfully. His partner was evaluated for infertility and everything was found to be normal, reportedly. His testosterone panel was evaluated and found to be normal, though his LH and FSH levels were elevated with normal prolactin and estradiol levels. Patient denies any issues with erectile dysfunction. Also admits that the desire is present. Denies any changes in his hair growth or shaving habits. Denies any changes in testicular size or any notable firmness, though notes one hangs lower than the other though both are descended. He is taking an extract called FRANNY extract which he started in December 2017. Never took testosterone before. No recent infections, hx of clamydia and gonnorhea in college but was treated and had not recurred. This however was started after the noted abnormal LH/FSH levels. He is also taking biotin and a multivitamin. Energy levels are reported to be stable. Denies headaches or vision problems, wears glasses at baseline.      Component      Latest Ref Rng & Units 10/19/2017 10/19/2017 10/19/2017 10/19/2017           8:43 AM  8:43 AM  8:43 AM  8:43 AM   Luteinizing hormone      1.7 - 8.6 mIU/mL    9.1 (H)   FSH      1.5 - 12.4 mIU/mL    14.7 (H)   Testosterone      264 - 916 ng/dL   390    Free testosterone (Direct)      8.7 - 25.1 pg/mL   10.1    TSH      0.450 - 4.500 uIU/mL       Estradiol      7.6 - 42.6 pg/mL  9.8     Prolactin      4.0 - 15.2 ng/mL 6.8        Component      Latest Ref Rng & Units 10/19/2017           8:43 AM   Luteinizing hormone 1.7 - 8.6 mIU/mL    FSH      1.5 - 12.4 mIU/mL    Testosterone      264 - 916 ng/dL    Free testosterone (Direct)      8.7 - 25.1 pg/mL    TSH      0.450 - 4.500 uIU/mL 0.722   Estradiol      7.6 - 42.6 pg/mL    Prolactin      4.0 - 15.2 ng/mL        PAST MEDICAL/SURGICAL HISTORY:   Past Medical History:   Diagnosis Date    Abnormal liver function test 10/19/2017    Decreased sperm motility 8/14/2017    Exotropia of both eyes 10/19/2017    Hearing problem of both ears 10/19/2017     Past Surgical History:   Procedure Laterality Date    HX HEENT         ALLERGIES:   No Known Allergies    MEDICATIONS ON ADMISSION:     Current Outpatient Prescriptions:     multivitamin (ONE A DAY) tablet, Take 1 Tab by mouth daily. , Disp: , Rfl:     FRANNY EXTRACT PO, Take  by mouth., Disp: , Rfl:     BIOTIN PO, Take 300 mg by mouth., Disp: , Rfl:     SOCIAL HISTORY:   Social History     Social History    Marital status: UNKNOWN     Spouse name: N/A    Number of children: N/A    Years of education: N/A     Occupational History    Not on file. Social History Main Topics    Smoking status: Never Smoker    Smokeless tobacco: Never Used    Alcohol use 1.8 oz/week     3 Cans of beer per week    Drug use: 5.00 per week     Special: Marijuana    Sexual activity: Not on file     Other Topics Concern    Not on file     Social History Narrative       FAMILY HISTORY:  Family History   Problem Relation Age of Onset    Diabetes Mother     Kidney Disease Mother     Hypertension Mother     Diabetes Father     Kidney Disease Father     Stroke Father        REVIEW OF SYSTEMS: Complete ROS assessed and noted for that which is described above, all else are negative.   Eyes: normal  ENT: normal  CVS: normal  Resp: normal  GI: normal  : normal  GYN: normal  Endocrine: normal  Integument: normal  Musculoskeletal: normal  Neuro: normal  Psych: normal      PHYSICAL EXAMINATION:    VITAL SIGNS:  Visit Vitals    /83 (BP 1 Location: Left arm, BP Patient Position: Sitting)    Pulse 74    Ht 5' 6\" (1.676 m)    Wt 192 lb 1.6 oz (87.1 kg)    BMI 31.01 kg/m2       GENERAL: NCAT, Sitting comfortably, NAD  EYES: EOMI, non-icteric, no proptosis  Ear/Nose/Throat: NCAT, no inflammation, no masses  LYMPH NODES: No LAD  CARDIOVASCULAR: S1 S2, RRR, No murmur, 2+ radial pulses  RESPIRATORY: CTA b/l, no wheeze/rales  GASTROINTESTINAL: soft, NT, ND,  MUSCULOSKELETAL: Normal ROM, no atrophy  SKIN: warm, no edema/rash/ or other skin changes  NEUROLOGIC: 5/5 power all extremities, no tremors, AAOx3  PSYCHIATRIC: Normal affect, Normal insight and judgement      REVIEW OF LABORATORY AND RADIOLOGY DATA:   Labs and documentation have been reviewed as described above. ASSESSMENT AND PLAN:   Alesha Sapp is a 29 y.o. male with a PMHx as noted above who was referred to our endocrinology clinic for evaluation of elevated gonadotropins. Elevated LH and FSH level    From reviewing the labs, it appears that LH/FSH levels are getting higher with time, at least per the interval period during when it was checked last year. His testosterone levels are normal though this may be due to an early stage of a chronic process, though not 100% clear. He is asymptomatic and this does fit that picture as well. As his TSH and prolactin level appear to be normal, and no symptoms of pituitary lesion present, a primary pituitary lesion is less likely, and a primary testicular lesion is more likely, and we discussed the possibility of Klinefelters disease as a possibility which should be evaluated. We will update his labs since it has not been checked since Oct 2017, to know his current status. We discussed the notion of evaluation with a fertility specialist for semen analysis and preservation / IVF if becomes necessary. Cautioned against supplements in his particular case.     Plan:   Repeat Total and free testosterone, LH/FSH, Prolactin around 8 AM fasting,  Will check a karyotype analysis  Will discuss results by phone, advised that karyotyping results may take some time,  Plan for a 2 month f/u in clinic,    Everardo Macias.  [address] [facility] End[facility]

## 2018-06-29 ENCOUNTER — OFFICE VISIT (OUTPATIENT)
Dept: ENDOCRINOLOGY | Age: 33
End: 2018-06-29

## 2018-06-29 VITALS
SYSTOLIC BLOOD PRESSURE: 113 MMHG | BODY MASS INDEX: 32.59 KG/M2 | WEIGHT: 202.8 LBS | HEIGHT: 66 IN | HEART RATE: 78 BPM | DIASTOLIC BLOOD PRESSURE: 78 MMHG

## 2018-06-29 DIAGNOSIS — R79.89 ABNORMAL PITUITARY LUTEINIZING HORMONE (LH): Primary | ICD-10-CM

## 2018-06-29 NOTE — PROGRESS NOTES
CHIEF COMPLAINT: elevated gonadotropins    HISTORY OF PRESENT ILLNESS:   Adelina Montaño is a 34 y.o. male with a PMHx as noted below who presents for f/u of elevated gonadotropins. Initial History:  Patient notes that he discussed concerns regarding infertility. He has been trying with his partner for a couple of years unsuccessfully. His partner was evaluated for infertility and everything was found to be normal, reportedly. His testosterone panel was evaluated and found to be normal, though his LH and FSH levels were elevated with normal prolactin and estradiol levels. Patient denies any issues with erectile dysfunction. Also admits that the desire is present. Denies any changes in his hair growth or shaving habits. Denies any changes in testicular size or any notable firmness, though notes one hangs lower than the other though both are descended. He is taking an extract called FRANNY extract which he started in December 2017. Never took testosterone before. No recent infections, hx of clamydia and gonnorhea in college but was treated and had not recurred. This however was started after the noted abnormal LH/FSH levels. He is also taking biotin and a multivitamin. Energy levels are reported to be stable. Denies headaches or vision problems, wears glasses at baseline. Interval History:  Patient just completed his labs just 4 days ago, most resulted but karyotyping is pending as expected. His testosterone (total and free) are normal, just mildly reduced from prior level though not significantly. Prolactin remains normal. LH stable but his [address] elevated. Total T: 372,   Free T: 9.4  LH 7.2  FSH 16.8  Prolactin 9.4  Patient again denies sexual dysfunction,  Regarding supplements, still taking the extracts, though notes he started that after he found out about his levels. He notes today that his girlfriend is now pregnant as of a week ago.        PAST MEDICAL/SURGICAL HISTORY:   Past Medical History: Diagnosis Date    Abnormal liver function test 10/19/2017    Decreased sperm motility 8/14/2017    Exotropia of both eyes 10/19/2017    Hearing problem of both ears 10/19/2017     Past Surgical History:   Procedure Laterality Date    HX HEENT         ALLERGIES:   No Known Allergies    MEDICATIONS ON ADMISSION:     Current Outpatient Prescriptions:     ashwagandha root extract,bulk, 2.5 % powd, by Does Not Apply route., Disp: , Rfl:     FENUGREEK SEED EXTRACT PO, Take  by mouth., Disp: , Rfl:     multivitamin (ONE A DAY) tablet, Take 1 Tab by mouth daily. , Disp: , Rfl:     BIOTIN PO, Take 300 mg by mouth., Disp: , Rfl:     FRANNY EXTRACT PO, Take  by mouth., Disp: , Rfl:     SOCIAL HISTORY:   Social History     Social History    Marital status: UNKNOWN     Spouse name: N/A    Number of children: N/A    Years of education: N/A     Occupational History    Not on file. Social History Main Topics    Smoking status: Never Smoker    Smokeless tobacco: Never Used    Alcohol use 1.8 oz/week     3 Cans of beer per week    Drug use: 5.00 per week     Special: Marijuana    Sexual activity: Not on file     Other Topics Concern    Not on file     Social History Narrative       FAMILY HISTORY:  Family History   Problem Relation Age of Onset    Diabetes Mother     Kidney Disease Mother     Hypertension Mother     Diabetes Father     Kidney Disease Father     Stroke Father        REVIEW OF SYSTEMS: Complete ROS assessed and noted for that which is described above, all else are negative.   Eyes: normal  ENT: normal  CVS: normal  Resp: normal  GI: normal  : normal  GYN: normal  Endocrine: normal  Integument: normal  Musculoskeletal: normal  Neuro: normal  Psych: normal      PHYSICAL EXAMINATION:    VITAL SIGNS:  Visit Vitals    /78 (BP 1 Location: Left arm, BP Patient Position: Sitting)    Pulse 78    Ht 5' 6\" (1.676 m)    Wt 202 lb 12.8 oz (92 kg)    BMI 32.73 kg/m2       GENERAL: NCAT, Sitting comfortably, NAD  EYES: EOMI, non-icteric, no proptosis  Ear/Nose/Throat: NCAT, no inflammation, no masses  LYMPH NODES: No LAD  CARDIOVASCULAR: S1 S2, RRR, No murmur, 2+ radial pulses  RESPIRATORY: CTA b/l, no wheeze/rales  GASTROINTESTINAL: soft, NT, ND,  MUSCULOSKELETAL: Normal ROM, no atrophy  SKIN: warm, no edema/rash/ or other skin changes  NEUROLOGIC: 5/5 power all extremities, no tremors, AAOx3  PSYCHIATRIC: Normal affect, Normal insight and judgement      REVIEW OF LABORATORY AND RADIOLOGY DATA:   Labs and documentation have been reviewed as described above. ASSESSMENT AND PLAN:   Burke Mims is a 34 y.o. male with a PMHx as noted above who presents for f/u of elevated gonadotropins. Elevated LH and FSH level    It is reassuring that his partner is able to get pregnant which suggests that he does still produce adequate viable sperm for reproduction. His testosterone levels though in the normal range, still lean toward the low normal side, and his gonadotropins keep fluctuating which I find to be suspicious for primary gonadal issue. We are still awaiting the result of his karyotype analysis of which I will advise him when received. Plan:   Awaiting karyotype analysis,   Counseled him to make sure his spouse considers prenatal vitamins and proper care, he notes that they will in fact have an appointment and ultrasound very soon. 3 month follow up with Testosterone panel    El AWAN  [address] [facility] End[facility]

## 2018-06-29 NOTE — PATIENT INSTRUCTIONS
I will call you with results,     Plan to follow up in 3 months,     Labs 3-4 days prior to your visit,     Vijay Kumar.  [address] [facility] Endocrinology  [address] [place] [address]

## 2018-06-29 NOTE — MR AVS SNAPSHOT
[address] [facility] [address] [address] [phone] Patient: Mannie Loja MRN: [id number] Visit Information Date & Time Provider Department Dept. Phone Encounter #  
 6/29/2018  2:30 PM Reny Lee, [address] [place] [address] Diabetes and Endocrinology  Follow-up Instructions Return in about 3 months (around 9/29/2018). Upcoming Health Maintenance Date Due Influenza Age 5 to Adult 8/1/2018 DTaP/Tdap/Td series (2 - Td) 8/19/2022 Allergies as of 6/29/2018  Review Complete On: 6/29/2018 By: Reny Lee MD  
 No Known Allergies Current Immunizations  Never Reviewed Name Date Tdap  Deferred (Patient Refused), 8/19/2012 Not reviewed this visit You Were Diagnosed With   
  
 Codes Comments Abnormal pituitary luteinizing hormone (LH)    -  Primary ICD-10-CM: R79.9 ICD-9-CM: 790.99 Vitals BP Pulse Height(growth percentile) Weight(growth percentile) BMI Smoking Status 113/78 (BP 1 Location: Left arm, BP Patient Position: Sitting) 78 5' 6\" (1.676 m) 202 lb 12.8 oz (92 kg) 32.73 kg/m2 Never Smoker BMI and BSA Data Body Mass Index Body Surface Area 32.73 kg/m 2 2.07 m 2 Preferred Pharmacy Pharmacy Name Phone [facility] [address] [place] [address] [phone] Your Updated Medication List  
  
   
This list is accurate as of 6/29/18  3:02 PM.  Always use your most recent med list.  
  
  
  
  
 ashwagandha root extract(bulk) 2.5 % Powd  
by Does Not Apply route. BIOTIN PO Take 300 mg by mouth. FENUGREEK SEED EXTRACT PO Take  by mouth. FRANNY EXTRACT PO Take  by mouth.  
  
 multivitamin tablet Commonly known as:  ONE A DAY Take 1 Tab by mouth daily. We Performed the Following [facility] AND  [44487 CPT(R)] TESTOSTERONE, FREE & TOTAL [17159 CPT(R)] Follow-up Instructions Return in about 3 months (around 9/29/2018). Patient Instructions I will call you with results,  
 
Plan to follow up in 3 months,  
 
Labs 3-4 days prior to your visit, Desmond Sommers. [address] [facility] & Endocrinology [address] [facility] SERVICES! Dear Rosibel Young: Thank you for requesting a [employer] account. Our records indicate that you already have an active [employer] account. You can access your account anytime at [email or web address] Did you know that you can access your hospital and ER discharge instructions at any time in [employer]? You can also review all of your test results from your hospital stay or ER visit. Additional Information If you have questions, please visit the Frequently Asked Questions section of the [employer] website at [email or web address] Remember, [employer] is NOT to be used for urgent needs. For medical emergencies, dial 911. Now available from your iPhone and Android! Please provide this summary of care documentation to your next provider. Your primary care clinician is listed as Lukasz Camacho. If you have any questions after today's visit, please call [phone].

## 2018-07-10 LAB
CELLS ANALYZED: 20
CELLS COUNTED: 20
CELLS KARYOTYPED.TOTAL BLD/T: 2
CLINICAL CYTOGENETICIST SPEC: NORMAL
DIAGNOSTIC IMP SPEC-IMP: NORMAL
FSH SERPL-ACNC: 16.8 MIU/ML (ref 1.5–12.4)
ISCN BAND LEVEL QL: 500
KARYOTYP BLD/T: NORMAL
LH SERPL-ACNC: 7.2 MIU/ML (ref 1.7–8.6)
PDF, 451356: NORMAL
PROLACTIN SERPL-MCNC: 9.4 NG/ML (ref 4–15.2)
SPECIMEN SOURCE: NORMAL
TESTOST FREE SERPL-MCNC: 9.4 PG/ML (ref 8.7–25.1)
TESTOST SERPL-MCNC: 372 NG/DL (ref 264–916)

## 2018-07-11 ENCOUNTER — TELEPHONE (OUTPATIENT)
Dept: ENDOCRINOLOGY | Age: 33
End: 2018-07-11

## 2018-07-11 NOTE — TELEPHONE ENCOUNTER
I called  Perri Bustamante and relayed the message from Dr. Angelo Flood.  He understood the information  Jack Conn

## 2018-07-11 NOTE — TELEPHONE ENCOUNTER
----- Message from Evans Landa MD sent at 7/11/2018 10:12 AM EDT -----  Mr. Sai Gaona genetic test is normal.  He does not have Klinefelters,  We will need to keep an eye on his testosterone levels and if not improved with next visit, we may consider getting a testicular ultrasound. Thanks,     Keyon Rae.  [address] [facility] Endocrinology  [address] [place] [address]

## 2018-07-11 NOTE — PROGRESS NOTES
Mr. Suzan Mcmahan genetic test is normal.  He does not have Klinefelters,  We will need to keep an eye on his testosterone levels and if not improved with next visit, we may consider getting a testicular ultrasound. Thanks,     Jose G Westbrook.  [facility] Endocrinology  [address] [place] [address]

## 2018-09-28 ENCOUNTER — OFFICE VISIT (OUTPATIENT)
Dept: ENDOCRINOLOGY | Age: 33
End: 2018-09-28

## 2018-09-28 VITALS
WEIGHT: 206.1 LBS | HEIGHT: 66 IN | BODY MASS INDEX: 33.12 KG/M2 | DIASTOLIC BLOOD PRESSURE: 75 MMHG | SYSTOLIC BLOOD PRESSURE: 114 MMHG | HEART RATE: 76 BPM

## 2018-09-28 DIAGNOSIS — R79.89 ABNORMAL PITUITARY LUTEINIZING HORMONE (LH): Primary | ICD-10-CM

## 2018-09-28 NOTE — PROGRESS NOTES
CHIEF COMPLAINT: elevated gonadotropins    HISTORY OF PRESENT ILLNESS:   Janusz Hendrix is a 34 y.o. male with a PMHx as noted below who presents for f/u of elevated gonadotropins. Initial History:  Patient notes that he discussed concerns regarding infertility. He has been trying with his partner for a couple of years unsuccessfully. His partner was evaluated for infertility and everything was found to be normal, reportedly. His testosterone panel was evaluated and found to be normal, though his LH and FSH levels were elevated with normal prolactin and estradiol levels. Patient denies any issues with erectile dysfunction. Also admits that the desire is present. Denies any changes in his hair growth or shaving habits. Denies any changes in testicular size or any notable firmness, though notes one hangs lower than the other though both are descended. He is taking an extract called FRANNY extract which he started in December 2017. Never took testosterone before. No recent infections, hx of clamydia and gonnorhea in college but was treated and had not recurred. This however was started after the noted abnormal LH/FSH levels. He is also taking biotin and a multivitamin. Energy levels are reported to be stable. Denies headaches or vision problems, wears glasses at baseline. Interval History:  Patient noted previously that he had been working on fertility with his girlfriend.    Noted on prior visit that she did get pregnant, but had a miscarriage around 9 weeks,  Patient also notably has not had any symptoms of sexual dysfunction,  A karyotype obtained revealed normal findings, none suggestive of klinefelter's,  Labs on prior visit as follows below, note that prelabs for this visit not completed,  Component      Latest Ref Rng & Units 6/25/2018 6/25/2018 6/25/2018           9:30 AM  9:30 AM  9:30 AM   Testosterone      264 - 916 ng/dL   372   Free testosterone (Direct)      8.7 - 25.1 pg/mL   9.4   Luteinizing hormone      1.7 - 8.6 mIU/mL  7.2    FSH      1.5 - 12.4 mIU/mL  16.8 (H)    Prolactin      4.0 - 15.2 ng/mL 9.4         PAST MEDICAL/SURGICAL HISTORY:   Past Medical History:   Diagnosis Date    Abnormal liver function test 10/19/2017    Decreased sperm motility 8/14/2017    Exotropia of both eyes 10/19/2017    Hearing problem of both ears 10/19/2017     Past Surgical History:   Procedure Laterality Date    HX HEENT         ALLERGIES:   No Known Allergies    MEDICATIONS ON ADMISSION:     Current Outpatient Prescriptions:     ashwagandha root extract,bulk, 2.5 % powd, by Does Not Apply route., Disp: , Rfl:     FENUGREEK SEED EXTRACT PO, Take  by mouth., Disp: , Rfl:     multivitamin (ONE A DAY) tablet, Take 1 Tab by mouth daily. , Disp: , Rfl:     BIOTIN PO, Take 300 mg by mouth., Disp: , Rfl:     FRANNY EXTRACT PO, Take  by mouth., Disp: , Rfl:     SOCIAL HISTORY:   Social History     Social History    Marital status: UNKNOWN     Spouse name: N/A    Number of children: N/A    Years of education: N/A     Occupational History    Not on file. Social History Main Topics    Smoking status: Never Smoker    Smokeless tobacco: Never Used    Alcohol use 1.8 oz/week     3 Cans of beer per week    Drug use: 5.00 per week     Special: Marijuana    Sexual activity: Not on file     Other Topics Concern    Not on file     Social History Narrative       FAMILY HISTORY:  Family History   Problem Relation Age of Onset    Diabetes Mother     Kidney Disease Mother     Hypertension Mother     Diabetes Father     Kidney Disease Father     Stroke Father        REVIEW OF SYSTEMS: Complete ROS assessed and noted for that which is described above, all else are negative.   Eyes: normal  ENT: normal  CVS: normal  Resp: normal  GI: normal  : normal  GYN: normal  Endocrine: normal  Integument: normal  Musculoskeletal: normal  Neuro: normal  Psych: normal      PHYSICAL EXAMINATION:    VITAL SIGNS:  Visit Vitals    /75 (BP 1 Location: Left arm, BP Patient Position: Sitting)    Pulse 76    Ht 5' 6\" (1.676 m)    Wt 206 lb 1.6 oz (93.5 kg)    BMI 33.27 kg/m2       GENERAL: NCAT, Sitting comfortably, NAD  EYES: EOMI, non-icteric, no proptosis  Ear/Nose/Throat: NCAT, no inflammation, no masses  LYMPH NODES: No LAD  CARDIOVASCULAR: S1 S2, RRR, No murmur, 2+ radial pulses  RESPIRATORY: CTA b/l, no wheeze/rales  GASTROINTESTINAL: soft, NT, ND,  MUSCULOSKELETAL: Normal ROM, no atrophy  SKIN: warm, no edema/rash/ or other skin changes  NEUROLOGIC: 5/5 power all extremities, no tremors, AAOx3  PSYCHIATRIC: Normal affect, Normal insight and judgement    REVIEW OF LABORATORY AND RADIOLOGY DATA:   Labs and documentation have been reviewed as described above. ASSESSMENT AND PLAN:   Ericka Rascon is a 34 y.o. male with a PMHx as noted above who presents for f/u of elevated gonadotropins. Elevated LH and FSH level    Patient's wife had a miscarriage for unclear reasons at 9 weeks. We do know however that he was able to maintain fertility. It is still not clear why his gonadotropins rise. His karyotype is normal and he has not been on testosterone in the past. He had not completed his levels before this visit so we will have him complete 8 AM labs to monitor this. I advised him if his gonadotropins continue to rise and testosterone continues to drop the next step would be to obtain an US of the testes. Plan:   Testosterone / LH/FSH levels at 8 AM to be completed, will review,  Plan for a 6 month f/u visit, will order these labs after todays labs have resulted to avoid confusion at the laboratory for multiple orders,    Matt Kellogg.  [address] [facility] Diabetes & Endocrinology

## 2018-09-28 NOTE — MR AVS SNAPSHOT
[address] [facility] [address] [address] [phone] Patient: Alka Brody MRN: [id number] HANNAH:[id number] Visit Information Date & Time Provider Department Dept. Phone Encounter #  
 9/28/2018  2:30 PM Laura Shankar, [address] [place] [address] Diabetes and Endocrinology [phone] Follow-up Instructions Return in about 6 months (around 3/28/2019). Upcoming Health Maintenance Date Due Influenza Age 5 to Adult 8/1/2018 DTaP/Tdap/Td series (2 - Td) 8/19/2022 Allergies as of 9/28/2018  Review Complete On: 9/28/2018 By: Laura Shankar MD  
 No Known Allergies Current Immunizations  Never Reviewed Name Date Tdap  Deferred (Patient Refused), 8/19/2012 Not reviewed this visit You Were Diagnosed With   
  
 Codes Comments Abnormal pituitary luteinizing hormone (LH)    -  Primary ICD-10-CM: R79.9 ICD-9-CM: 790.99 Vitals BP Pulse Height(growth percentile) Weight(growth percentile) BMI Smoking Status 114/75 (BP 1 Location: Left arm, BP Patient Position: Sitting) 76 5' 6\" (1.676 m) 206 lb 1.6 oz (93.5 kg) 33.27 kg/m2 Never Smoker BMI and BSA Data Body Mass Index Body Surface Area  
 33.27 kg/m 2 2.09 m 2 Preferred Pharmacy Pharmacy Name Phone [facility] [address] [place] [address] [phone] Your Updated Medication List  
  
   
This list is accurate as of 9/28/18  3:02 PM.  Always use your most recent med list.  
  
  
  
  
 ashwagandha root extract(bulk) 2.5 % Powd  
by Does Not Apply route. BIOTIN PO Take 300 mg by mouth. FENUGREEK SEED EXTRACT PO Take  by mouth. FRANNY EXTRACT PO Take  by mouth.  
  
 multivitamin tablet Commonly known as:  ONE A DAY Take 1 Tab by mouth daily. Follow-up Instructions Return in about 6 months (around 3/28/2019). Introducing [facility]! Dear Dung Bearden: Thank you for requesting a [employer] account. Our records indicate that you already have an active [employer] account. You can access your account anytime at [email or web address] Did you know that you can access your hospital and ER discharge instructions at any time in [employer]? You can also review all of your test results from your hospital stay or ER visit. Additional Information If you have questions, please visit the Frequently Asked Questions section of the [employer] website at [email or web address] Remember, [employer] is NOT to be used for urgent needs. For medical emergencies, dial 911. Now available from your iPhone and Android! Please provide this summary of care documentation to your next provider. Your primary care clinician is listed as Lalitha Moise. If you have any questions after today's visit, please call [phone].

## 2019-03-21 ENCOUNTER — OFFICE VISIT (OUTPATIENT)
Dept: ENDOCRINOLOGY | Age: 34
End: 2019-03-21

## 2019-03-21 VITALS
BODY MASS INDEX: 30.53 KG/M2 | WEIGHT: 190 LBS | HEIGHT: 66 IN | HEART RATE: 75 BPM | DIASTOLIC BLOOD PRESSURE: 71 MMHG | SYSTOLIC BLOOD PRESSURE: 118 MMHG

## 2019-03-21 DIAGNOSIS — R79.89 ABNORMAL PITUITARY LUTEINIZING HORMONE (LH): Primary | ICD-10-CM

## 2019-03-21 LAB
FSH SERPL-ACNC: 14.7 MIU/ML (ref 1.5–12.4)
LH SERPL-ACNC: 5.9 MIU/ML (ref 1.7–8.6)
TESTOST FREE SERPL-MCNC: 9.4 PG/ML (ref 8.7–25.1)
TESTOST SERPL-MCNC: 380 NG/DL (ref 264–916)

## 2019-03-21 NOTE — PATIENT INSTRUCTIONS
See you back in 1 year, call with concerns,     Pedrito Jenkins.  [address] [facility] Endocrinology  [address] [place] [address]

## 2019-03-21 NOTE — PROGRESS NOTES
CHIEF COMPLAINT: elevated gonadotropins    HISTORY OF PRESENT ILLNESS:   Hayley Sorensen is a 34 y.o. male with a PMHx as noted below who presents for f/u of elevated gonadotropins. Hx of infertility with elevated LH/FSH. Normal testosterone and prolactin. Since then his wife was pregnant though did not make it to term,  I advised monitoring levels since elevated LH can mean future low T,  Since the last visit, he has been feeling well,   He has made some lifestyle changes with better diet and more exercise,  He is still working on fertility with his wife, no child since last visit,  We reviewed his results and noted unchanged free testosterone level,  LH/FSH levels have come down a bit however,    Component      Latest Ref Rng & Units 3/19/2019 3/19/2019 6/25/2018           9:01 AM  9:01 AM  9:30 AM   Specimen type         Blood   Cells counted         20   Cells analyzed         20   Cells karyotyped         2   GTG Band Resolution         500   Cytogenetic Result         46 X Y   Chromosome Anal. Blood Interp. Normal   Testosterone      264 - 916 ng/dL  380    Free testosterone (Direct)      8.7 - 25.1 pg/mL  9.4    Luteinizing hormone      1.7 - 8.6 mIU/mL 5.9     FSH      1.5 - 12.4 mIU/mL 14.7 (H)           PAST MEDICAL/SURGICAL HISTORY:   Past Medical History:   Diagnosis Date    Abnormal liver function test 10/19/2017    Decreased sperm motility 8/14/2017    Exotropia of both eyes 10/19/2017    Hearing problem of both ears 10/19/2017     Past Surgical History:   Procedure Laterality Date    HX HEENT         ALLERGIES:   No Known Allergies    MEDICATIONS ON ADMISSION:     Current Outpatient Medications:     ashwagandha root extract,bulk, 2.5 % powd, by Does Not Apply route., Disp: , Rfl:     FENUGREEK SEED EXTRACT PO, Take  by mouth., Disp: , Rfl:     multivitamin (ONE A DAY) tablet, Take 1 Tab by mouth daily. , Disp: , Rfl:     FRANNY EXTRACT PO, Take  by mouth., Disp: , Rfl:     BIOTIN PO, Take 300 mg by mouth., Disp: , Rfl:     SOCIAL HISTORY:   Social History     Socioeconomic History    Marital status: UNKNOWN     Spouse name: Not on file    Number of children: Not on file    Years of education: Not on file    Highest education level: Not on file   Occupational History    Not on file   Social Needs    Financial resource strain: Not on file    Food insecurity:     Worry: Not on file     Inability: Not on file    Transportation needs:     Medical: Not on file     Non-medical: Not on file   Tobacco Use    Smoking status: Never Smoker    Smokeless tobacco: Never Used   Substance and Sexual Activity    Alcohol use: Yes     Alcohol/week: 1.8 oz     Types: 3 Cans of beer per week    Drug use: Yes     Frequency: 5.0 times per week     Types: Marijuana    Sexual activity: Not on file   Lifestyle    Physical activity:     Days per week: Not on file     Minutes per session: Not on file    Stress: Not on file   Relationships    Social connections:     Talks on phone: Not on file     Gets together: Not on file     Attends Synagogue service: Not on file     Active member of club or organization: Not on file     Attends meetings of clubs or organizations: Not on file     Relationship status: Not on file    Intimate partner violence:     Fear of current or ex partner: Not on file     Emotionally abused: Not on file     Physically abused: Not on file     Forced sexual activity: Not on file   Other Topics Concern    Not on file   Social History Narrative    Not on file       FAMILY HISTORY:  Family History   Problem Relation Age of Onset    Diabetes Mother     Kidney Disease Mother     Hypertension Mother     Diabetes Father     Kidney Disease Father     Stroke Father        REVIEW OF SYSTEMS: Complete ROS assessed and noted for that which is described above, all else are negative.   Eyes: normal  ENT: normal  CVS: normal  Resp: normal  GI: normal  : normal  GYN: normal  Endocrine: normal  Integument: normal  Musculoskeletal: normal  Neuro: normal  Psych: normal      PHYSICAL EXAMINATION:    VITAL SIGNS:  Visit Vitals  /71 (BP 1 Location: Left arm, BP Patient Position: Sitting)   Pulse 75   Ht 5' 6\" (1.676 m)   Wt 190 lb (86.2 kg)   BMI 30.67 kg/m²       GENERAL: NCAT, Sitting comfortably, NAD  EYES: EOMI, non-icteric, no proptosis  Ear/Nose/Throat: NCAT, no inflammation, no masses  LYMPH NODES: No LAD  CARDIOVASCULAR: S1 S2, RRR, No murmur, 2+ radial pulses  RESPIRATORY: CTA b/l, no wheeze/rales  GASTROINTESTINAL: soft, NT, ND,  MUSCULOSKELETAL: Normal ROM, no atrophy  SKIN: warm, no edema/rash/ or other skin changes  NEUROLOGIC: 5/5 power all extremities, no tremors, AAOx3  PSYCHIATRIC: Normal affect, Normal insight and judgement    REVIEW OF LABORATORY AND RADIOLOGY DATA:   Labs and documentation have been reviewed as described above. ASSESSMENT AND PLAN:   Attila High is a 34 y.o. male with a PMHx as noted above who presents for f/u of elevated gonadotropins. Elevated LH and FSH level    Normal karyotype with recent pregnancy noted though did not go to term. Labs seem to be improved slightly, while Testosterone though at the low end, is normal.   We will not seek a testicular US at this time but will consider it later if needed. Advised him to return in 1 year with pre-labs, welcome to call with concerns,     [facility] [place] [facility]  [address] [facility] Endocr[facility]ology

## 2019-04-03 ENCOUNTER — OFFICE VISIT (OUTPATIENT)
Dept: FAMILY MEDICINE CLINIC | Age: 34
End: 2019-04-03

## 2019-04-03 VITALS
RESPIRATION RATE: 20 BRPM | TEMPERATURE: 96.7 F | HEIGHT: 66 IN | WEIGHT: 184.8 LBS | BODY MASS INDEX: 29.7 KG/M2 | HEART RATE: 72 BPM | SYSTOLIC BLOOD PRESSURE: 102 MMHG | DIASTOLIC BLOOD PRESSURE: 72 MMHG | OXYGEN SATURATION: 99 %

## 2019-04-03 DIAGNOSIS — R86.8 DECREASED SPERM MOTILITY: ICD-10-CM

## 2019-04-03 DIAGNOSIS — J38.4: ICD-10-CM

## 2019-04-03 DIAGNOSIS — L20.89 FLEXURAL ATOPIC DERMATITIS: Primary | ICD-10-CM

## 2019-04-03 RX ORDER — DIPHENHYDRAMINE HCL 25 MG
25 TABLET ORAL
Qty: 30 TAB | Refills: 0 | Status: SHIPPED | OUTPATIENT
Start: 2019-04-03

## 2019-04-03 RX ORDER — BETAMETHASONE DIPROPIONATE 0.5 MG/G
CREAM TOPICAL 2 TIMES DAILY
Qty: 45 G | Refills: 11 | Status: SHIPPED | OUTPATIENT
Start: 2019-04-03

## 2019-04-03 NOTE — PROGRESS NOTES
HISTORY OF PRESENT ILLNESS Saad Uriostegui is a 34 y.o. male. HPI Rash of the neck both elbows and rt forearm and around the kneesStarted few weeks ago not better tried alcohol washing and OTC antibiotic ointments, dosenot tingles and not pain full, states that is notexpanding red, and not  swelled up, no burning but with itchiness Pt present for his testosterone def, pt has been w/out any serious hepatic, renal or any cardiac diseases, today he states that he is doing great , patient also state that he has been seen by specialist endocrinologist in every few months his blood work gets checked and did discuss the result fortunately he has been able to pregnant his wife but unfortunately few months ago to had a miscarriage to have been tried since then stating that he is able to maintain erection no ms weakness and no bp elevation so far, Upper respiratory problem Started >9 days ago not better,  
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  FENUGREEK SEED EXTRACT PO Take  by mouth.  multivitamin (ONE A DAY) tablet Take 1 Tab by mouth daily.  FRANNY EXTRACT PO Take  by mouth.  BIOTIN PO Take 300 mg by mouth. No Known Allergies Past Medical History:  
Diagnosis Date  Abnormal liver function test 10/19/2017  Decreased sperm motility 8/14/2017  Exotropia of both eyes 10/19/2017  Hearing problem of both ears 10/19/2017 Past Surgical History:  
Procedure Laterality Date  HX HEENT Family History Problem Relation Age of Onset  Diabetes Mother  Kidney Disease Mother  Hypertension Mother  Diabetes Father  Kidney Disease Father  Stroke Father Social History Tobacco Use  Smoking status: Never Smoker  Smokeless tobacco: Never Used Substance Use Topics  Alcohol use: Yes Alcohol/week: 1.8 oz Types: 3 Cans of beer per week Lab Results Component Value Date/Time WBC 3.9 10/19/2017 08:43 AM  
 HGB 15.1 10/19/2017 08:43 AM  
 HCT 42.9 10/19/2017 08:43 AM  
 PLATELET 846 64/15/0033 08:43 AM  
 MCV 80 10/19/2017 08:43 AM  
 
Lab Results Component Value Date/Time GFR est non-AA 99 10/19/2017 08:43 AM  
 GFR est  10/19/2017 08:43 AM  
 Creatinine 1.01 10/19/2017 08:43 AM  
 BUN 12 10/19/2017 08:43 AM  
 Sodium 145 (H) 10/19/2017 08:43 AM  
 Potassium 4.7 10/19/2017 08:43 AM  
 Chloride 106 10/19/2017 08:43 AM  
 CO2 25 10/19/2017 08:43 AM  
  
Review of Systems Constitutional: Negative for chills and fever. HENT: Negative for ear pain and nosebleeds. Eyes: Negative for blurred vision, pain and discharge. Respiratory: Negative for shortness of breath. Cardiovascular: Negative for chest pain and leg swelling. Gastrointestinal: Negative for constipation, diarrhea, nausea and vomiting. Genitourinary: Negative for frequency. Musculoskeletal: Negative for joint pain. Skin: Positive for itching and rash. Neurological: Negative for headaches. Psychiatric/Behavioral: Negative for depression. The patient is not nervous/anxious. Physical Exam  
Constitutional: He is oriented to person, place, and time. He appears well-developed and well-nourished. HENT:  
Head: Normocephalic and atraumatic. Mouth/Throat: No oropharyngeal exudate. Eyes: Conjunctivae and EOM are normal.  
Neck: Normal range of motion. Neck supple. Cardiovascular: Normal rate, regular rhythm and normal heart sounds. No murmur heard. Pulmonary/Chest: Effort normal and breath sounds normal. No respiratory distress. Abdominal: Soft. Bowel sounds are normal. He exhibits no distension. There is no rebound. Musculoskeletal: He exhibits no edema or tenderness. Neurological: He is alert and oriented to person, place, and time. Skin: Skin is warm and dry. Rash noted. There is erythema. Psychiatric: He has a normal mood and affect. His behavior is normal.  
Nursing note and vitals reviewed. ASSESSMENT and PLAN Diagnoses and all orders for this visit: 1. Flexural atopic dermatitis -     betamethasone dipropionate (DIPROSONE) 0.05 % topical cream; Apply  to affected area two (2) times a day. -     diphenhydrAMINE (BENADRYL ALLERGY) 25 mg tablet; Take 1 Tab by mouth nightly as needed for Sleep (congestion). 2. Decreased sperm motility 3. Allergic reaction causing accumulated fluid in larynx 
-     diphenhydrAMINE (BENADRYL ALLERGY) 25 mg tablet; Take 1 Tab by mouth nightly as needed for Sleep (congestion). Patient was told to follow-up with the endocrinologist for further care and avoid pregnancy for the next 12 months since that incidence, was told to call for any concern Salt gurgle, incr po fluid intake, avoid cigs and 2nd hand exposure, rts if worsens, tylenol otc for pain, advised on meds side effects and compliance, hand washing and hygiene advised Discussed the patient's BMI with him. The BMI follow up plan is as follows:  
 
dietary management education, guidance, and counseling 
encourage exercise 
monitor weight 
prescribed dietary intake An After Visit Summary was printed and given to the patient.

## 2019-04-03 NOTE — PROGRESS NOTES
Name and  verified Chief Complaint Patient presents with  Rash  
  right arm patient complaint of itching for two months 1. Have you been to the ER, urgent care clinic since your last visit? Hospitalized since your last visit? no 
 
2. Have you seen or consulted any other health care providers outside of the [address] [place] [address] since your last visit? Include any pap smears or colon screening. Yes, Patient first visit 2019 for sore throat.

## 2019-04-03 NOTE — PATIENT INSTRUCTIONS
Rash: Care Instructions Your Care Instructions A rash is any irritation or inflammation of the skin. Rashes have many possible causes, including allergy, infection, illness, heat, and emotional stress. Follow-up care is a key part of your treatment and safety. Be sure to make and go to all appointments, Follicle-Stimulating Hormone [facility]): About This Test 
What is it? This test measures the amount of follicle-stimulating hormone [facility]) in a blood sample. This hormone is made by the pituitary gland. · In women, [address] helps control the menstrual cycle and the production of eggs by the ovaries. · In men, FSH helps control the production of sperm. Why is this test done? The amounts of [address] and other hormones are measured to: · Find out why a couple cannot become pregnant. · Help diagnose menstrual problems or find out whether a woman has gone through menopause. · See why a child is going through early or delayed puberty. · Help diagnose certain pituitary gland problems, such as a tumor. How can you prepare for the test? 
· Up to 4 weeks before the test, you may be asked to stop taking birth control pills or other medicines that contain estrogen or progesterone. What happens during the test? 
· A health professional takes a sample of your blood. What else should you know about the test? 
· Your results will include an explanation of what a \"normal\" result is. This is called a \"reference range. \" It is just a guide. Your doctor will evaluate your results based on your health and other factors. This means that a value that falls outside the normal values listed may still be normal for you. How long does the test take? · The test will take a few minutes. What happens after the test? 
· You will be able to go home right away. · You can go back to your usual activities right away. Follow-up care is a key part of your treatment and safety.  Be sure to make and go to all appointments, and call your doctor if you are having problems. It's also a good idea to keep a list of the medicines you take. Ask your doctor when you can expect to have your test results. Where can you learn more? Go to http://soniya-leona.info/. Enter E488 in the search box to learn more about \"Follicle-Stimulating Hormone [facility]): About This Test.\" Current as of: September 5, 2018 Content Version: 11.9 © 4468-1716 [employer]. Care instructions adapted under license by [employer] (which disclaims liability or warranty for this information). If you have questions about a medical condition or this instruction, always ask your healthcare professional. [address] any warranty or liability for your use of this information. and call your doctor if you are having problems. It's also a good idea to know your test results and keep a list of the medicines you take. How can you care for yourself at home? · Wash the area with water only. Soap can make dryness and itching worse. Pat dry. · Put cold, wet cloths on the rash to reduce itching. · Keep cool, and stay out of the sun. · Leave the rash open to the air as much of the time as possible. · Sometimes petroleum jelly (Vaseline) can help relieve the discomfort caused by a rash. A moisturizing lotion, such as Cetaphil, also may help. Calamine lotion may help for rashes caused by contact with something (such as a plant or soap) that irritated the skin. Use it 3 or 4 times a day. · If your doctor prescribed a cream, use it as directed. If your doctor prescribed medicine, take it exactly as directed. · If your rash itches so badly that it interferes with your normal activities, take an over-the-counter antihistamine, such as diphenhydramine (Benadryl) or loratadine (Claritin). Read and follow all instructions on the label. When should you call for help? Call your doctor now or seek immediate medical care if: 
  · You have signs of infection, such as: 
? Increased pain, swelling, warmth, or redness. ? Red streaks leading from the area. ? Pus draining from the area. ? A fever.  
  · You have joint pain along with the rash.  
 Watch closely for changes in your health, and be sure to contact your doctor if: 
  · Your rash is changing or getting worse. For example, call if you have pain along with the rash, the rash is spreading, or you have new blisters.  
  · You do not get better after 1 week. Where can you learn more? Go to http://soniya-leona.info/. Enter E171 in the search box to learn more about \"Rash: Care Instructions. \" Current as of: April 17, 2018 Content Version: 11.9 © 2755-6841 [employer]. Care instructions adapted under license by [employer] (which disclaims liability or warranty for this information). If you have questions about a medical condition or this instruction, always ask your healthcare professional. [place] [address] any warranty or liability for your use of this information. Body Mass Index: Care Instructions Your Care Instructions Body mass index (BMI) can help you see if your weight is raising your risk for health problems. It uses a formula to compare how much you weigh with how tall you are. · A BMI lower than 18.5 is considered underweight. · A BMI between 18.5 and 24.9 is considered healthy. · A BMI between 25 and 29.9 is considered overweight. A BMI of 30 or higher is considered obese. If your BMI is in the normal range, it means that you have a lower risk for weight-related health problems. If your BMI is in the overweight or obese range, you may be at increased risk for weight-related health problems, such as high blood pressure, heart disease, stroke, arthritis or joint pain, and diabetes.  If your BMI is in the underweight range, you may be at increased risk for health problems such as fatigue, lower protection (immunity) against illness, muscle loss, bone loss, hair loss, and hormone problems. BMI is just one measure of your risk for weight-related health problems. You may be at higher risk for health problems if you are not active, you eat an unhealthy diet, or you drink too much alcohol or use tobacco products. Follow-up care is a key part of your treatment and safety. Be sure to make and go to all appointments, and call your doctor if you are having problems. It's also a good idea to know your test results and keep a list of the medicines you take. How can you care for yourself at home? · Practice healthy eating habits. This includes eating plenty of fruits, vegetables, whole grains, lean protein, and low-fat dairy. · If your doctor recommends it, get more exercise. Walking is a good choice. Bit by bit, increase the amount you walk every day. Try for at least 30 minutes on most days of the week. · Do not smoke. Smoking can increase your risk for health problems. If you need help quitting, talk to your doctor about stop-smoking programs and medicines. These can increase your chances of quitting for good. · Limit alcohol to 2 drinks a day for men and 1 drink a day for women. Too much alcohol can cause health problems. If you have a BMI higher than 25 · Your doctor may do other tests to check your risk for weight-related health problems. This may include measuring the distance around your waist. A waist measurement of more than 40 inches in men or 35 inches in women can increase the risk of weight-related health problems. · Talk with your doctor about steps you can take to stay healthy or improve your health. You may need to make lifestyle changes to lose weight and stay healthy, such as changing your diet and getting regular exercise. If you have a BMI lower than 18.5 · Your doctor may do other tests to check your risk for health problems. · Talk with your doctor about steps you can take to stay healthy or improve your health. You may need to make lifestyle changes to gain or maintain weight and stay healthy, such as getting more healthy foods in your diet and doing exercises to build muscle. Where can you learn more? Go to http://soniya-leona.info/. Enter S176 in the search box to learn more about \"Body Mass Index: Care Instructions. \" Current as of: October 13, 2016 Content Version: 11.4 © 8788-5201 [employer]. Care instructions adapted under license by [employer] (which disclaims liability or warranty for this information). If you have questions about a medical condition or this instruction, always ask your healthcare professional. [address] any Atopic Dermatitis: Care Instructions Your Care Instructions Atopic dermatitis (also called eczema) is a skin problem that causes intense itching and a red, raised rash. In severe cases, the rash develops clear fluidfilled blisters. The rash is not contagious. People with this condition seem to have very sensitive immune systems that are likely to react to things that cause allergies. The immune system is the body's way of fighting infection. There is no cure for atopic dermatitis, but you may be able to control it with care at home. Follow-up care is a key part of your treatment and safety. Be sure to make and go to all appointments, and call your doctor if you are having problems. It's also a good idea to know your test results and keep a list of the medicines you take. How can you care for yourself at home? · Use moisturizer at least twice a day. · If your doctor prescribes a cream, use it as directed. If your doctor prescribes other medicine, take it exactly as directed. · Wash the affected area with water only.  Soap can make dryness and itching worse. Pat dry. · Apply a moisturizer after bathing. Use a cream such as Lubriderm, Moisturel, or Cetaphil that does not irritate the skin or cause a rash. Apply the cream while your skin is still damp after lightly drying with a towel. · Use cold, wet cloths to reduce itching. · Keep cool, and stay out of the sun. · If itching affects your normal activities, an over-the-counter antihistamine, such as diphenhydramine (Benadryl) or loratadine (Claritin) may help. Read and follow all instructions on the label. When should you call for help? Call your doctor now or seek immediate medical care if: 
  · Your rash gets worse and you have a fever.  
  · You have new blisters or bruises, or the rash spreads and looks like a sunburn.  
  · You have signs of infection, such as: 
? Increased pain, swelling, warmth, or redness. ? Red streaks leading from the rash. ? Pus draining from the rash. ? A fever.  
  · You have crusting or oozing sores.  
  · You have joint aches or body aches along with your rash.  
 Watch closely for changes in your health, and be sure to contact your doctor if: 
  · Your rash does not clear up after 2 to 3 weeks of home treatment.  
  · Itching interferes with your sleep or daily activities. Where can you learn more? Go to http://soniya-leona.info/. Enter G965 in the search box to learn more about \"Atopic Dermatitis: Care Instructions. \" Current as of: April 17, 2018 Content Version: 11.9 © 1517-0823 [employer]. Care instructions adapted under license by [employer] (which disclaims liability or warranty for this information). If you have questions about a medical condition or this instruction, always ask your healthcare professional. [address] any warranty or liability for your use of this information. warranty or liability for your use of this information.

## 2020-03-31 ENCOUNTER — VIRTUAL VISIT (OUTPATIENT)
Dept: FAMILY MEDICINE CLINIC | Age: 35
End: 2020-03-31

## 2020-03-31 DIAGNOSIS — N30.91 HEMATURIA DUE TO CYSTITIS: Primary | ICD-10-CM

## 2020-03-31 RX ORDER — CIPROFLOXACIN 500 MG/1
500 TABLET ORAL 2 TIMES DAILY
Qty: 20 TAB | Refills: 0 | Status: SHIPPED | OUTPATIENT
Start: 2020-03-31 | End: 2020-04-10

## 2020-03-31 NOTE — PROGRESS NOTES
HISTORY OF PRESENT ILLNESS  Horace Lewis is a 30 y.o. male. HPI     Pt main complaints were provided on virtual visit and telemed format secondary to the current national and state emergency in order to decrease the transmission rate of the corona virus,  pt is w/out comorbid history not at high risk  Present on VV for the concerns for the current medical conditions and stating that the pt has had no traveling and unaware if the pt has been exposed to any covid-19 individual, currently at work, has no fever no cough no dyspnea, with the c/o blood in the urine not after the rough sex, with Urinary Frequency , which stopped lasted of one night and then next day small, now new meds,   The history is provided by the patient. This is a new problem. The current episode started more than 1 week ago. The problem occurs every urination. The problem has been gradually worsening. The quality of the pain is described as burning. The pain is at a severity of 2/10. There has been no fever. There is no history of pyelonephritis. Associated symptoms include frequency, hesitancy and urgency. Pertinent negatives include no chills, no sweats, no nausea, no vomiting, no discharge, no flank pain, , no abdominal pain and no back pain. past medical history does not include kidney stones, single kidney, urological procedure, recurrent UTIs or urinary stasis. Current Outpatient Medications   Medication Sig Dispense Refill    betamethasone dipropionate (DIPROSONE) 0.05 % topical cream Apply  to affected area two (2) times a day. 45 g 11    diphenhydrAMINE (BENADRYL ALLERGY) 25 mg tablet Take 1 Tab by mouth nightly as needed for Sleep (congestion). 30 Tab 0    ashwagandha root extract,bulk, 2.5 % powd Take  by mouth daily.  FENUGREEK SEED EXTRACT PO Take  by mouth daily.  multivitamin (ONE A DAY) tablet Take 1 Tab by mouth daily.  FRANNY EXTRACT PO Take  by mouth daily.  BIOTIN PO Take 500 mg by mouth daily. No Known Allergies  Past Medical History:   Diagnosis Date    Abnormal liver function test 10/19/2017    Decreased sperm motility 8/14/2017    Exotropia of both eyes 10/19/2017    Hearing problem of both ears 10/19/2017     Past Surgical History:   Procedure Laterality Date    HX HEENT       Family History   Problem Relation Age of Onset    Diabetes Mother     Kidney Disease Mother     Hypertension Mother     Diabetes Father     Kidney Disease Father     Stroke Father      Social History     Tobacco Use    Smoking status: Never Smoker    Smokeless tobacco: Never Used   Substance Use Topics    Alcohol use: Yes     Alcohol/week: 3.0 standard drinks     Types: 3 Cans of beer per week      Lab Results   Component Value Date/Time    WBC 3.9 10/19/2017 08:43 AM    HGB 15.1 10/19/2017 08:43 AM    HCT 42.9 10/19/2017 08:43 AM    PLATELET 995 01/15/4938 08:43 AM    MCV 80 10/19/2017 08:43 AM     Lab Results   Component Value Date/Time    Prostate Specific Ag 0.7 10/19/2017 08:43 AM        Review of Systems   Constitutional: Negative for chills and fever. HENT: Negative for congestion and nosebleeds. Eyes: Negative for blurred vision and pain. Respiratory: Negative for cough, shortness of breath and wheezing. Cardiovascular: Negative for chest pain and leg swelling. Gastrointestinal: Negative for constipation, diarrhea, nausea and vomiting. Genitourinary: Negative for dysuria and frequency. Musculoskeletal: Negative for joint pain and myalgias. Skin: Negative for itching and rash. Neurological: Negative for dizziness, loss of consciousness and headaches. Psychiatric/Behavioral: Negative for depression. The patient is not nervous/anxious and does not have insomnia. Physical Exam  Neurological:      Mental Status: He is alert and oriented to person, place, and time.    Psychiatric:         Mood and Affect: Mood normal.         Behavior: Behavior normal.         Thought Content: Thought content normal.         Judgment: Judgment normal.         ASSESSMENT and PLAN  Diagnoses and all orders for this visit:    1. Hematuria due to cystitis  -     ciprofloxacin HCl (CIPRO) 500 mg tablet; Take 1 Tab by mouth two (2) times a day for 10 days. Concern abdout COVID-19 addressed and detailed, pt was told that the best way to prevent illness is to avoid being exposed to this virus, by clean hands often, use a hand  that contains at least 60% alcohol, Avoid touching your eyes, nose, and mouth with unwashed hand, Avoid close contact with people who are sick , Put distance between yourself and other people, Stay home if you are sick, except to get medical care, Cover your mouth and nose, Throw used tissues in the trash, Wear a facemask if you are sick,      Pursuant to the emergency declaration under the [employer] and [facility], 1135 waiver authority and the [employer] Act, this Virtual Visit was conducted, with patient's consent, to reduce the patient's risk of exposure to COVID-19 and provide continuity of care for an established patient. Pt was also told if develop dyspnea needs to call 911 or go to er, call for krysten advise, pt agreed with todays recommendations    Services were provided through a video synchronous discussion virtually to substitute for in-person appointment.

## 2020-03-31 NOTE — PROGRESS NOTES
Chief Complaint   Patient presents with    Blood in Urine     1. Have you been to the ER, urgent care clinic since your last visit? Hospitalized since your last visit? No    2. Have you seen or consulted any other health care providers outside of the [address] [place] [address] since your last visit? Include any pap smears or colon screening. No      C/o  blood in urine,  No blood in urine at present but hard to urinate.

## 2020-11-02 ENCOUNTER — VIRTUAL VISIT (OUTPATIENT)
Dept: FAMILY MEDICINE CLINIC | Age: 35
End: 2020-11-02
Payer: COMMERCIAL

## 2020-11-02 DIAGNOSIS — Z02.89 ENCOUNTER TO OBTAIN EXCUSE FROM WORK: ICD-10-CM

## 2020-11-02 DIAGNOSIS — Z71.89 ADVICE GIVEN ABOUT COVID-19 VIRUS INFECTION: ICD-10-CM

## 2020-11-02 DIAGNOSIS — F41.0 PANIC ATTACK AS REACTION TO STRESS: ICD-10-CM

## 2020-11-02 DIAGNOSIS — F40.01 PANIC DISORDER WITH AGORAPHOBIA: Primary | ICD-10-CM

## 2020-11-02 DIAGNOSIS — F43.0 PANIC ATTACK AS REACTION TO STRESS: ICD-10-CM

## 2020-11-02 PROCEDURE — 99214 OFFICE O/P EST MOD 30 MIN: CPT | Performed by: FAMILY MEDICINE

## 2020-11-02 NOTE — PROGRESS NOTES
Chief Complaint   Patient presents with    Documentation     1. Have you been to the ER, urgent care clinic since your last visit? Hospitalized since your last visit? No    2. Have you seen or consulted any other health care providers outside of the [address] [place] [address] since your last visit? Include any pap smears or colon screening. No      Call placed to pt. Verified patient with two type of identifiers. requesting a doctors note to stay virtual teaching. Pt is an  ,  Stated he can not wear a   mask on all day makes it hard at times to breathe. Also stated he has increased about catching covid 19.

## 2020-11-02 NOTE — PROGRESS NOTES
HISTORY OF PRESENT ILLNESS  Uzma Hollins is a 37 y.o. male. Pt's main concerns were provided on virtual visit, a telemed format,  pt is w/ comorbid medical history and unaware of been exposed to covid-19 individual, Pt Have been working from home for couple of wks,    pt has no fever no cough no dyspnea, no ha, not dizzy, nl smell nl taste, no N/V/D, no body ache. 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 Patient denies any suicidal homicidal ideation not feeling depressed unfortunately he is very stressed out about COVID-19,          Current Outpatient Medications   Medication Sig Dispense Refill    betamethasone dipropionate (DIPROSONE) 0.05 % topical cream Apply  to affected area two (2) times a day. 45 g 11    diphenhydrAMINE (BENADRYL ALLERGY) 25 mg tablet Take 1 Tab by mouth nightly as needed for Sleep (congestion). 30 Tab 0    ashwagandha root extract,bulk, 2.5 % powd Take  by mouth daily.  FENUGREEK SEED EXTRACT PO Take  by mouth daily.  multivitamin (ONE A DAY) tablet Take 1 Tab by mouth daily.  FRANNY EXTRACT PO Take  by mouth daily.  BIOTIN PO Take 500 mg by mouth daily.        No Known Allergies  Past Medical History:   Diagnosis Date    Abnormal liver function test 10/19/2017    Decreased sperm motility 8/14/2017    Exotropia of both eyes 10/19/2017    Hearing problem of both ears 10/19/2017     Past Surgical History:   Procedure Laterality Date    HX HEENT       Family History   Problem Relation Age of Onset    Diabetes Mother     Kidney Disease Mother     Hypertension Mother     Diabetes Father     Kidney Disease Father     Stroke Father      Social History     Tobacco Use    Smoking status: Never Smoker    Smokeless tobacco: Never Used   Substance Use Topics    Alcohol use: Yes     Alcohol/week: 3.0 standard drinks     Types: 3 Cans of beer per week      Lab Results   Component Value Date/Time    WBC 3.9 10/19/2017 08:43 AM    HGB 15.1 10/19/2017 08:43 AM    HCT 42.9 10/19/2017 08:43 AM    PLATELET 269 16/47/8455 08:43 AM    MCV 80 10/19/2017 08:43 AM     Lab Results   Component Value Date/Time    Cholesterol, total 141 10/19/2017 08:43 AM    HDL Cholesterol 40 10/19/2017 08:43 AM    LDL, calculated 90 10/19/2017 08:43 AM    Triglyceride 55 10/19/2017 08:43 AM     Lab Results   Component Value Date/Time    TSH 0.722 10/19/2017 08:43 AM         Review of Systems   Constitutional: Negative for chills, fever and malaise/fatigue. HENT: Negative for nosebleeds. Eyes: Negative for pain. Respiratory: Negative for cough and wheezing. Cardiovascular: Negative for chest pain and leg swelling. Gastrointestinal: Negative for constipation, diarrhea and nausea. Genitourinary: Negative for frequency. Musculoskeletal: Negative for joint pain and myalgias. Skin: Negative for rash. Neurological: Negative for loss of consciousness and headaches. Endo/Heme/Allergies: Does not bruise/bleed easily. Psychiatric/Behavioral: Negative for depression. The patient is nervous/anxious. The patient does not have insomnia. All other systems reviewed and are negative. Physical Exam  Constitutional:       Appearance: Normal appearance. HENT:      Head: Normocephalic and atraumatic. Nose: Nose normal. No congestion. Neurological:      Mental Status: He is alert and oriented to person, place, and time. Psychiatric:         Attention and Perception: Perception normal. He is attentive. He does not perceive auditory or visual hallucinations.          Mood and Affect: Mood is anxious. Mood is not depressed or elated. Affect is not labile, blunt, flat, angry, tearful or inappropriate. Speech: He is communicative. Speech is not rapid and pressured, delayed, slurred or tangential.         Behavior: Behavior is slowed. Behavior is not agitated, aggressive, withdrawn, hyperactive or combative. Thought Content: Thought content normal. Thought content is not paranoid. Thought content does not include homicidal or suicidal ideation. Cognition and Memory: Cognition is not impaired. Memory is not impaired. He does not exhibit impaired recent memory or impaired remote memory. Judgment: Judgment normal. Judgment is not impulsive or inappropriate. ASSESSMENT and PLAN  Diagnoses and all orders for this visit:    1. Panic disorder with agoraphobia    2. Panic attack as reaction to stress    3. Advice given about COVID-19 virus infection    4. Encounter to obtain excuse from work        Secondary to the patient acute medical conditions, a letter on the pt's behalf was completed, pt's multiple questions answered to the best knowledge,  will keep a copy in the chart for further correspondence, patient was informed about the safety and  regulations regarding this condition patient was also advised to follow-up with HR for further guidelines patient acknowledged understanding and agreed with today's recommendations         Concern abdout COVID-19 addressed and detailed, pt was told that the best way to prevent illness is by protection, to Wear a facemask , having social distance, and to get tested and re-tested, with contact tracing,  Also was advised to stay in isolation for 10-14 days if any cold sxs present, Pt was also told if develop dyspnea needs to call 911 or go to er, [facility], pt agreed with today's visit.    Pursuant to the emergency declaration under the [address] and [facility], [facility] waiver authority and the Coronavirus Preparedness and Response Supplemental Appropriations Act, this Virtual Visit was conducted, with patient's consent, to reduce the patient's risk of exposure to COVID-19 and provide continuity of care for an established patient. Today's recommendations, Services were provided through a Video synchronous discussion virtually to substitute for in-person appointment.

## 2020-11-02 NOTE — LETTER
NOTIFICATION RETURN TO WORK / SCHOOL 
 
 
 
11/2/2020 3:21 PM 
 
Mr. Ashwini Menjivar [phone] [id number] To Whom It May Concern: Ashwini Menjivar is currently under the care of [facility]. He has a current medical condition of panic attacks of getting COVID-19 and agoraphobia With panic  
 
disorder due to Covid-19 Pandemic. Mr. El Mitchell will be able to do his work without difficulty from his own resident virtually but at this time he is  
 
unable to do his work by attending school due to his current medical condition. If there are questions or concerns please have the patient contact our office.  
 
 
 
Sincerely, 
 
 
Kenyon Shetty MD

## 2022-03-19 PROBLEM — H50.10 EXOTROPIA OF BOTH EYES: Status: ACTIVE | Noted: 2017-10-19

## 2022-03-19 PROBLEM — H91.93 HEARING PROBLEM OF BOTH EARS: Status: ACTIVE | Noted: 2017-10-19

## 2022-03-19 PROBLEM — R86.8 DECREASED SPERM MOTILITY: Status: ACTIVE | Noted: 2017-08-14

## 2022-03-19 PROBLEM — R79.89 ABNORMAL LIVER FUNCTION TEST: Status: ACTIVE | Noted: 2017-10-19

## 2023-05-17 RX ORDER — BETAMETHASONE DIPROPIONATE 0.5 MG/G
CREAM TOPICAL 2 TIMES DAILY
COMMUNITY
Start: 2019-04-03